# Patient Record
Sex: FEMALE | Race: WHITE | NOT HISPANIC OR LATINO | Employment: FULL TIME | URBAN - NONMETROPOLITAN AREA
[De-identification: names, ages, dates, MRNs, and addresses within clinical notes are randomized per-mention and may not be internally consistent; named-entity substitution may affect disease eponyms.]

---

## 2023-12-12 ENCOUNTER — INITIAL PRENATAL (OUTPATIENT)
Dept: OBSTETRICS AND GYNECOLOGY | Facility: CLINIC | Age: 38
End: 2023-12-12
Payer: COMMERCIAL

## 2023-12-12 VITALS — DIASTOLIC BLOOD PRESSURE: 74 MMHG | WEIGHT: 171 LBS | SYSTOLIC BLOOD PRESSURE: 120 MMHG | BODY MASS INDEX: 25.25 KG/M2

## 2023-12-12 DIAGNOSIS — Z36.0 ENCOUNTER FOR ANTENATAL SCREENING FOR CHROMOSOMAL ANOMALIES: ICD-10-CM

## 2023-12-12 DIAGNOSIS — Z3A.14 14 WEEKS GESTATION OF PREGNANCY: ICD-10-CM

## 2023-12-12 DIAGNOSIS — Z36.3 SCREENING, ANTENATAL, FOR MALFORMATION BY ULTRASOUND: ICD-10-CM

## 2023-12-12 DIAGNOSIS — G43.009 MIGRAINE WITHOUT AURA AND WITHOUT STATUS MIGRAINOSUS, NOT INTRACTABLE: ICD-10-CM

## 2023-12-12 DIAGNOSIS — Z71.85 IMMUNIZATION COUNSELING: ICD-10-CM

## 2023-12-12 DIAGNOSIS — O36.80X0 ENCOUNTER TO DETERMINE FETAL VIABILITY OF PREGNANCY, SINGLE OR UNSPECIFIED FETUS: Primary | ICD-10-CM

## 2023-12-12 DIAGNOSIS — Z98.890 HISTORY OF LOOP ELECTROSURGICAL EXCISION PROCEDURE (LEEP): ICD-10-CM

## 2023-12-12 DIAGNOSIS — Z34.82 MULTIGRAVIDA IN SECOND TRIMESTER: ICD-10-CM

## 2023-12-12 DIAGNOSIS — O09.529 ANTEPARTUM MULTIGRAVIDA OF ADVANCED MATERNAL AGE: ICD-10-CM

## 2023-12-12 RX ORDER — BUTALBITAL, ACETAMINOPHEN AND CAFFEINE 300; 40; 50 MG/1; MG/1; MG/1
1 CAPSULE ORAL EVERY 4 HOURS PRN
Qty: 30 CAPSULE | Refills: 0 | Status: SHIPPED | OUTPATIENT
Start: 2023-12-12

## 2023-12-12 NOTE — PROGRESS NOTES
38-year old patient arrived to initiate prenatal care.     HPI: 38-year old . Patient's last menstrual period was 10/05/2023. She did not think she was this far along in pregnancy as she experienced what she believed was menses in October. Pre-pregnancy weight of 165 pounds.    Previous prenatal history significant for:  c/s x 1 for persistent marginal placenta previa  History significant for: migraine, abnormal pap smear, LEEP procedure, UTIs    The following portion of the patient's history were reviewed and updated as needed: allergies, current medications, past family history, past medical history, social history, surgical history, and problem list.    ROS: All systems reviewed and are negative with exception of the following: nausea, headaches/migraines, amenorrhea      US ordered today, reviewed and shows IUP of 14w5d gestation and Estimated Date of Delivery: 24.    Last Pap Smear: 2022 NILM (ECTZ present); HPV not detected    Exam:  Wt: 171 lb for TWG of 2.722 kg (6 lb), B/P 120/74, FHTs 145   General Appearance:  healthy-appearing .  HEENT:  NCAT, EOMI, neck supple, no thyroidmegaly.  HR str and reg. No murmur. Lungs clear. Resp even and unlabored.  Abd: Soft, nontender. No CVA tenderness. Uterus is consistent with EGA.  Ext: NT, nontender. No cyanosis or edema.    Diagnoses and all orders for this visit:    1. Encounter to determine fetal viability of pregnancy, single or unspecified fetus (Primary)  - Ultrasound today and reviewed: Viable alfaro intrauterine gestation. Estimated gestational age by fetal biometry is 14 weeks, 5 days. This is 5 weeks difference from estimated gestational age by reported LMP.     2. 14 weeks gestation of pregnancy  - Discussed initial prenatal labs and ordered today:  -     ToxASSURE Select 13 (MW) - Urine, Clean Catch  -     ABO / Rh  -     CBC & Differential  -     Antibody Screen  -     Hepatitis B Surface Antigen  -     RPR, Rfx Qn RPR /  Confirm TP  -     Rubella Antibody, IgG  -     Urine Culture - , Urine, Clean Catch  -     HIV-1 / O / 2 Ag / Antibody  -     Varicella Zoster Antibody, IgG  -     Chlamydia trachomatis, Neisseria gonorrhoeae, Trichomonas vaginalis, PCR - Urine, Urine, Random Void  -     HCV Antibody Rfx To Qnt PCR  -     [facility]  -     Kelly Panorama Prenatal Test: Chromosomes 13, 18, 21, X & Y: Triploidy 22Q.11.2 Deletion - Blood,    3. Multigravida in second trimester  - Reviewed information in new OB packet, including OTC medications for use during pregnancy, second timester of pregnancy and discomforts, regular OB routine, ffDNA and maternal carrier screening testing.  Second Trimester of Pregnancy video and Round Ligament Pain education included in AVS.  - Advised to maintain regular activity.  - Reviewed and encouraged pt to report vaginal bleeding, pelvic pain or cramping, any prolonged illness or infection, or any other concerns.  - RTO in 4 weeks with Dr. Newman with U/S for cervical length and as needed with concerns  -     ToxASSURE Select 13 (MW) - Urine, Clean Catch    4. Antepartum multigravida of advanced maternal age  - Discussed aspirin 81 mg daily related to risk of preeclampsia.  -     [facility]    5. Screening, , for malformation by ultrasound  - Discussed plan of referral to perinatology at approximately 20-weeks gestation for anatomy scan. Anatomy scan to assess for possible anomalies or markers for aneuploidy.   -     [facility]    6. Encounter for  screening for chromosomal anomalies  - Discussed ffDNA and maternal carrier screening, including the Kelly pamphlet. She desires ffDNA/chromosomal risk screening at this time.   -     Kelly Panorama Prenatal Test: Chromosomes 13, 18, 21, X & Y: Triploidy 22Q.11.2 Deletion - Blood,    7. Migraine without aura and without status migrainosus, not  intractable  Discussed headache relief measures: avoiding triggers, aromatherapy, warm/cold compresses, Tylenol OTC, Tylenol + Benadryl + small amount of caffeine at onset of headache, Magnesium, epsom salt bath for relaxation, massage, chiropractor, OMT consultation. Prescription for Fioricet refilled. Rivas to be reviewed.   -     butalbital-acetaminophen-caffeine (Fioricet) -40 MG capsule capsule; Take 1 capsule by mouth Every 4 (Four) Hours As Needed (migraine headache).  Dispense: 30 capsule; Refill: 0    8. History of loop electrosurgical excision procedure (LEEP)  - Plan for cervical length at upcoming ultrasound    9. Immunization counseling  - Discussed influenza vaccine recommendations during pregnancy. Patient declines to receive the influenza immunization today in the clinic. Influenza (Flu) Vaccine (Inactivated or Recombinant): What You Need to Know (VIS) education included in the AVS.        This note has been signed electronically.     Diana Stanford, DNP, APRN, CNM, RNC-OB

## 2023-12-14 LAB
ABO GROUP BLD: NORMAL
BACTERIA UR CULT: NORMAL
BACTERIA UR CULT: NORMAL
BASOPHILS # BLD AUTO: 0.03 10*3/MM3 (ref 0–0.2)
BASOPHILS NFR BLD AUTO: 0.3 % (ref 0–1.5)
BLD GP AB SCN SERPL QL: NEGATIVE
C TRACH RRNA SPEC QL NAA+PROBE: NEGATIVE
EOSINOPHIL # BLD AUTO: 0.1 10*3/MM3 (ref 0–0.4)
EOSINOPHIL NFR BLD AUTO: 1.1 % (ref 0.3–6.2)
ERYTHROCYTE [DISTWIDTH] IN BLOOD BY AUTOMATED COUNT: 13 % (ref 12.3–15.4)
HBV SURFACE AG SERPL QL IA: NEGATIVE
HCT VFR BLD AUTO: 41.5 % (ref 34–46.6)
HCV AB SERPL QL IA: NORMAL
HCV IGG SERPL QL IA: NON REACTIVE
HGB BLD-MCNC: 13.8 G/DL (ref 12–15.9)
HIV 1+2 AB+HIV1 P24 AG SERPL QL IA: NON REACTIVE
IMM GRANULOCYTES # BLD AUTO: 0.03 10*3/MM3 (ref 0–0.05)
IMM GRANULOCYTES NFR BLD AUTO: 0.3 % (ref 0–0.5)
LYMPHOCYTES # BLD AUTO: 1.82 10*3/MM3 (ref 0.7–3.1)
LYMPHOCYTES NFR BLD AUTO: 20.8 % (ref 19.6–45.3)
MCH RBC QN AUTO: 29.4 PG (ref 26.6–33)
MCHC RBC AUTO-ENTMCNC: 33.3 G/DL (ref 31.5–35.7)
MCV RBC AUTO: 88.5 FL (ref 79–97)
MONOCYTES # BLD AUTO: 0.4 10*3/MM3 (ref 0.1–0.9)
MONOCYTES NFR BLD AUTO: 4.6 % (ref 5–12)
N GONORRHOEA RRNA SPEC QL NAA+PROBE: NEGATIVE
NEUTROPHILS # BLD AUTO: 6.36 10*3/MM3 (ref 1.7–7)
NEUTROPHILS NFR BLD AUTO: 72.9 % (ref 42.7–76)
NRBC BLD AUTO-RTO: 0 /100 WBC (ref 0–0.2)
PLATELET # BLD AUTO: 262 10*3/MM3 (ref 140–450)
RBC # BLD AUTO: 4.69 10*6/MM3 (ref 3.77–5.28)
RH BLD: POSITIVE
RPR SER QL: NON REACTIVE
RUBV IGG SERPL IA-ACNC: 2.56 INDEX
T VAGINALIS RRNA SPEC QL NAA+PROBE: NEGATIVE
VZV IGG SER IA-ACNC: 481 INDEX
WBC # BLD AUTO: 8.74 10*3/MM3 (ref 3.4–10.8)

## 2023-12-19 LAB
DRUGS UR: NORMAL
Lab: NORMAL
NTRA 22Q11.2 DELETION SYNDROME POPULATION-BASED RISK TEXT: NORMAL
NTRA 22Q11.2 DELETION SYNDROME RESULT TEXT: NORMAL
NTRA 22Q11.2 DELETION SYNDROME RISK SCORE TEXT: NORMAL
NTRA FETAL FRACTION: NORMAL
NTRA GENDER OF FETUS: NORMAL
NTRA MONOSOMY X AGE-BASED RISK TEXT: NORMAL
NTRA MONOSOMY X RESULT TEXT: NORMAL
NTRA MONOSOMY X RISK SCORE TEXT: NORMAL
NTRA TRIPLOIDY RESULT TEXT: NORMAL
NTRA TRISOMY 13 AGE-BASED RISK TEXT: NORMAL
NTRA TRISOMY 13 RESULT TEXT: NORMAL
NTRA TRISOMY 13 RISK SCORE TEXT: NORMAL
NTRA TRISOMY 18 AGE-BASED RISK TEXT: NORMAL
NTRA TRISOMY 18 RESULT TEXT: NORMAL
NTRA TRISOMY 18 RISK SCORE TEXT: NORMAL
NTRA TRISOMY 21 AGE-BASED RISK TEXT: NORMAL
NTRA TRISOMY 21 RESULT TEXT: NORMAL
NTRA TRISOMY 21 RISK SCORE TEXT: NORMAL

## 2023-12-24 PROBLEM — O34.40 HX LEEP (LOOP ELECTROSURGICAL EXCISION PROCEDURE), CERVIX, PREGNANCY: Status: ACTIVE | Noted: 2023-12-24

## 2023-12-24 PROBLEM — O09.529 ANTEPARTUM MULTIGRAVIDA OF ADVANCED MATERNAL AGE: Status: ACTIVE | Noted: 2023-12-24

## 2023-12-24 PROBLEM — Z98.890 HX LEEP (LOOP ELECTROSURGICAL EXCISION PROCEDURE), CERVIX, PREGNANCY: Status: ACTIVE | Noted: 2023-12-24

## 2023-12-24 PROBLEM — Z86.69 H/O MIGRAINE DURING PREGNANCY: Status: ACTIVE | Noted: 2023-12-24

## 2023-12-24 PROBLEM — Z87.59 H/O MIGRAINE DURING PREGNANCY: Status: ACTIVE | Noted: 2023-12-24

## 2023-12-24 PROBLEM — Z34.90 PREGNANCY: Status: ACTIVE | Noted: 2023-12-24

## 2024-01-09 ENCOUNTER — ROUTINE PRENATAL (OUTPATIENT)
Dept: OBSTETRICS AND GYNECOLOGY | Facility: CLINIC | Age: 39
End: 2024-01-09
Payer: COMMERCIAL

## 2024-01-09 VITALS — BODY MASS INDEX: 25.7 KG/M2 | DIASTOLIC BLOOD PRESSURE: 72 MMHG | SYSTOLIC BLOOD PRESSURE: 108 MMHG | WEIGHT: 174 LBS

## 2024-01-09 DIAGNOSIS — O34.42 HX LEEP (LOOP ELECTROSURGICAL EXCISION PROCEDURE), CERVIX, PREGNANCY, SECOND TRIMESTER: ICD-10-CM

## 2024-01-09 DIAGNOSIS — Z34.82 MULTIGRAVIDA IN SECOND TRIMESTER: Primary | ICD-10-CM

## 2024-01-09 DIAGNOSIS — Z98.890 HX LEEP (LOOP ELECTROSURGICAL EXCISION PROCEDURE), CERVIX, PREGNANCY, SECOND TRIMESTER: ICD-10-CM

## 2024-01-09 DIAGNOSIS — Z3A.18 18 WEEKS GESTATION OF PREGNANCY: ICD-10-CM

## 2024-01-09 DIAGNOSIS — O34.219 PREVIOUS CESAREAN DELIVERY, ANTEPARTUM: ICD-10-CM

## 2024-01-09 PROCEDURE — 0502F SUBSEQUENT PRENATAL CARE: CPT | Performed by: OBSTETRICS & GYNECOLOGY

## 2024-01-09 NOTE — PROGRESS NOTES
Starting to feel fetal movement  Feeling well  Reviewed normal prenatal labs  Reviewed normal ffDNA  Has anatomy US tomorrow  First  done for placenta previa, considering TOLAC, discussed option of scheduled repeat , 1% risk of uterine rupture    Diagnoses and all orders for this visit:    1. Multigravida in second trimester (Primary)    2. Hx LEEP (loop electrosurgical excision procedure), cervix, pregnancy, second trimester    3. 18 weeks gestation of pregnancy    4. Previous  delivery, antepartum

## 2024-02-06 ENCOUNTER — ROUTINE PRENATAL (OUTPATIENT)
Dept: OBSTETRICS AND GYNECOLOGY | Age: 39
End: 2024-02-06
Payer: COMMERCIAL

## 2024-02-06 VITALS — SYSTOLIC BLOOD PRESSURE: 106 MMHG | WEIGHT: 178 LBS | BODY MASS INDEX: 26.29 KG/M2 | DIASTOLIC BLOOD PRESSURE: 70 MMHG

## 2024-02-06 DIAGNOSIS — O34.42 HX LEEP (LOOP ELECTROSURGICAL EXCISION PROCEDURE), CERVIX, PREGNANCY, SECOND TRIMESTER: ICD-10-CM

## 2024-02-06 DIAGNOSIS — Z28.21 INFLUENZA VACCINATION DECLINED: ICD-10-CM

## 2024-02-06 DIAGNOSIS — Z34.82 MULTIGRAVIDA IN SECOND TRIMESTER: ICD-10-CM

## 2024-02-06 DIAGNOSIS — Z98.890 HX LEEP (LOOP ELECTROSURGICAL EXCISION PROCEDURE), CERVIX, PREGNANCY, SECOND TRIMESTER: ICD-10-CM

## 2024-02-06 DIAGNOSIS — Z3A.22 22 WEEKS GESTATION OF PREGNANCY: Primary | ICD-10-CM

## 2024-02-06 DIAGNOSIS — O09.899 SINGLE UMBILICAL ARTERY AFFECTING MANAGEMENT OF MOTHER IN SINGLETON PREGNANCY, ANTEPARTUM: ICD-10-CM

## 2024-02-06 PROBLEM — Z84.1 FAMILY HISTORY OF KIDNEY DISEASE: Status: ACTIVE | Noted: 2024-01-09

## 2024-02-06 LAB
GLUCOSE UR STRIP-MCNC: NEGATIVE MG/DL
PROT UR STRIP-MCNC: NEGATIVE MG/DL

## 2024-02-06 PROCEDURE — 0502F SUBSEQUENT PRENATAL CARE: CPT | Performed by: ADVANCED PRACTICE MIDWIFE

## 2024-02-14 ENCOUNTER — HOSPITAL ENCOUNTER (OUTPATIENT)
Facility: HOSPITAL | Age: 39
Setting detail: OBSERVATION
Discharge: HOME | End: 2024-02-14
Attending: OBSTETRICS & GYNECOLOGY | Admitting: OBSTETRICS & GYNECOLOGY
Payer: COMMERCIAL

## 2024-02-14 VITALS
SYSTOLIC BLOOD PRESSURE: 134 MMHG | TEMPERATURE: 98.1 F | HEART RATE: 101 BPM | RESPIRATION RATE: 16 BRPM | DIASTOLIC BLOOD PRESSURE: 71 MMHG | OXYGEN SATURATION: 97 %

## 2024-02-14 LAB
A1 MICROGLOB PLACENTAL VAG QL: NEGATIVE
CLUE CELLS SPEC QL WET PREP: NORMAL
HYDATID CYST SPEC WET PREP: NORMAL
T VAGINALIS SPEC QL WET PREP: NORMAL
WBC SPEC QL WET PREP: NORMAL
YEAST GENITAL QL WET PREP: NORMAL

## 2024-02-14 PROCEDURE — G0463 HOSPITAL OUTPT CLINIC VISIT: HCPCS

## 2024-02-14 PROCEDURE — G0378 HOSPITAL OBSERVATION PER HR: HCPCS

## 2024-02-14 PROCEDURE — 87210 SMEAR WET MOUNT SALINE/INK: CPT | Performed by: OBSTETRICS & GYNECOLOGY

## 2024-02-14 PROCEDURE — 84112 EVAL AMNIOTIC FLUID PROTEIN: CPT | Performed by: OBSTETRICS & GYNECOLOGY

## 2024-02-14 NOTE — NURSING NOTE
Patient presents to unit with complaints of leaking of fluid. States it began approx 2 days ago. States she has felt like her underwear are frequently damp.    Juhi Barillas RN  10:36 CST

## 2024-02-14 NOTE — NURSING NOTE
Patient educated on S/S of labor, educated to return to L&D for consistent, painful contractions, leaking of fluid, vaginal bleeding, or decreased fetal movement. Urgent maternal warnings signs reviewed and handout given.    Juhi Barillas RN  11:13 CST

## 2024-03-05 ENCOUNTER — ROUTINE PRENATAL (OUTPATIENT)
Dept: OBSTETRICS AND GYNECOLOGY | Age: 39
End: 2024-03-05
Payer: COMMERCIAL

## 2024-03-05 VITALS — SYSTOLIC BLOOD PRESSURE: 120 MMHG | DIASTOLIC BLOOD PRESSURE: 74 MMHG | WEIGHT: 184 LBS | BODY MASS INDEX: 27.17 KG/M2

## 2024-03-05 DIAGNOSIS — O34.219 PREVIOUS CESAREAN DELIVERY, ANTEPARTUM: ICD-10-CM

## 2024-03-05 DIAGNOSIS — Z98.890 HX LEEP (LOOP ELECTROSURGICAL EXCISION PROCEDURE), CERVIX, PREGNANCY, SECOND TRIMESTER: ICD-10-CM

## 2024-03-05 DIAGNOSIS — O34.42 HX LEEP (LOOP ELECTROSURGICAL EXCISION PROCEDURE), CERVIX, PREGNANCY, SECOND TRIMESTER: ICD-10-CM

## 2024-03-05 DIAGNOSIS — O09.529 ANTEPARTUM MULTIGRAVIDA OF ADVANCED MATERNAL AGE: ICD-10-CM

## 2024-03-05 DIAGNOSIS — O09.899 SINGLE UMBILICAL ARTERY AFFECTING MANAGEMENT OF MOTHER IN SINGLETON PREGNANCY, ANTEPARTUM: Primary | ICD-10-CM

## 2024-03-05 PROCEDURE — 0502F SUBSEQUENT PRENATAL CARE: CPT | Performed by: OBSTETRICS & GYNECOLOGY

## 2024-03-05 NOTE — PROGRESS NOTES
Good fetal movement  Reviewed normal anatomy US  Glucola and Hgb ordered for next visit  US today 27% growth, POOJA 16.1cm    Diagnoses and all orders for this visit:    1. Single umbilical artery affecting management of mother in alfaro pregnancy, antepartum (Primary)    2. Hx LEEP (loop electrosurgical excision procedure), cervix, pregnancy, second trimester    3. Antepartum multigravida of advanced maternal age    4. Previous  delivery, antepartum

## 2024-03-19 ENCOUNTER — ROUTINE PRENATAL (OUTPATIENT)
Dept: OBSTETRICS AND GYNECOLOGY | Age: 39
End: 2024-03-19
Payer: COMMERCIAL

## 2024-03-19 VITALS — SYSTOLIC BLOOD PRESSURE: 122 MMHG | DIASTOLIC BLOOD PRESSURE: 68 MMHG | WEIGHT: 186 LBS | BODY MASS INDEX: 27.47 KG/M2

## 2024-03-19 DIAGNOSIS — Z71.85 IMMUNIZATION COUNSELING: ICD-10-CM

## 2024-03-19 DIAGNOSIS — O09.523 MULTIGRAVIDA OF ADVANCED MATERNAL AGE IN THIRD TRIMESTER: ICD-10-CM

## 2024-03-19 DIAGNOSIS — Z13.1 SPECIAL SCREENING EXAMINATION FOR DIABETES MELLITUS: ICD-10-CM

## 2024-03-19 DIAGNOSIS — Z13.0 SCREENING FOR DEFICIENCY ANEMIA: ICD-10-CM

## 2024-03-19 DIAGNOSIS — Z11.3 ROUTINE SCREENING FOR STI (SEXUALLY TRANSMITTED INFECTION): ICD-10-CM

## 2024-03-19 DIAGNOSIS — O09.899 SINGLE UMBILICAL ARTERY AFFECTING MANAGEMENT OF MOTHER IN SINGLETON PREGNANCY, ANTEPARTUM: ICD-10-CM

## 2024-03-19 DIAGNOSIS — Z3A.28 28 WEEKS GESTATION OF PREGNANCY: Primary | ICD-10-CM

## 2024-03-19 LAB
GLUCOSE UR STRIP-MCNC: NEGATIVE MG/DL
PROT UR STRIP-MCNC: NEGATIVE MG/DL

## 2024-03-19 PROCEDURE — 0502F SUBSEQUENT PRENATAL CARE: CPT | Performed by: ADVANCED PRACTICE MIDWIFE

## 2024-03-19 NOTE — PROGRESS NOTES
Reason for visit: Routine OB visit at 28w5d     CC:  Endorses good fetal movement. Denies VB, LOF, contractions, h/a, visual changes, and right upper quadrant pain.     ROS: All systems reviewed and are negative.    Wt 186 lb for a TWG of 9.526 kg (21 lb), /68, FHTs 132, FH 29 cm   Urine today and reviewed: negative glucose, negative protein      26-week Ultrasound: EFW 27%, 931 g; AC 27%; normal interval growth; transverse; posterior placenta       Exam:  General Appearance:  No visualized signs of distress. Normal mood and behavior  Cardiorespiratory: HR str and reg. Lungs clear. Resp even and unlabored.  Abdomen: is soft and nontender. No CVA tenderness. Uterus is consistent with an estimated gestational age.  Ext: No edema. Calves non-tender.     Impression  Diagnoses and all orders for this visit:    1. 28 weeks gestation of pregnancy (Primary)  -     POC Urinalysis Dipstick  -     Gestational Screen 1 Hr ([facility])  -     Hemoglobin  -     RPR, Rfx Qn RPR / Confirm TP    2. Multigravida of advanced maternal age in third trimester  Discussed third trimester of pregnancy, including discomforts and measures of support,  labor warnings, preeclampsia warnings, and signs and symptoms to report. Discussed glucose and hemoglobin screenings today in the clinic. Labs ordered today. Patient to notify provider and/or come to the hospital for vaginal bleeding, leaking of fluid, contractions, or other concerns. Third Trimester of Pregnancy video included in the AVS.    3. Single umbilical artery affecting management of mother in alfaro pregnancy, antepartum  26-week interval growth ultrasound reviewed and normal interval growth noted. Plan for interval growth at next prenatal visit.     4. Special screening examination for diabetes mellitus  -     Gestational Screen 1 Hr ([facility])    5. Screening for deficiency anemia  -     Hemoglobin    6. Routine screening for STI (sexually transmitted infection)  -      RPR, Rfx Qn RPR / Confirm TP    7. Immunization counseling  Reviewed Tdap immunization recommendations during pregnancy. Will consider receiving the Tdap immunization during next prenatal visit. Tdap (Tetanus, Diptheria, Pertussis) Vaccine: What You Need to Know (VIS) education included in the AVS.          Return to the office in 2 weeks for routine prenatal visit with an interval growth and 4D ultrasound and as needed with concerns.        This note has been signed electronically.    Diana Stanford, DNP, APRN, CNM, RNC-OB

## 2024-03-20 LAB
GLUCOSE 1H P 50 G GLC PO SERPL-MCNC: 121 MG/DL (ref 65–139)
HGB BLD-MCNC: 11.8 G/DL (ref 12–15.9)
RPR SER QL: NON REACTIVE

## 2024-04-09 ENCOUNTER — ROUTINE PRENATAL (OUTPATIENT)
Age: 39
End: 2024-04-09
Payer: COMMERCIAL

## 2024-04-09 VITALS — SYSTOLIC BLOOD PRESSURE: 108 MMHG | WEIGHT: 186 LBS | BODY MASS INDEX: 27.47 KG/M2 | DIASTOLIC BLOOD PRESSURE: 72 MMHG

## 2024-04-09 DIAGNOSIS — O34.219 PREVIOUS CESAREAN DELIVERY, ANTEPARTUM: ICD-10-CM

## 2024-04-09 DIAGNOSIS — O09.523 MULTIGRAVIDA OF ADVANCED MATERNAL AGE IN THIRD TRIMESTER: Primary | ICD-10-CM

## 2024-04-09 PROCEDURE — 90471 IMMUNIZATION ADMIN: CPT | Performed by: OBSTETRICS & GYNECOLOGY

## 2024-04-09 PROCEDURE — 90715 TDAP VACCINE 7 YRS/> IM: CPT | Performed by: OBSTETRICS & GYNECOLOGY

## 2024-04-09 PROCEDURE — 0502F SUBSEQUENT PRENATAL CARE: CPT | Performed by: OBSTETRICS & GYNECOLOGY

## 2024-04-09 NOTE — PROGRESS NOTES
Good fetal movement  Taking TUMs for reflux, recommend Pepcid for severe symptoms  Reviewed normal Glucola and Hgb  Schedule repeat    Tdap in office today   labor precautions    Diagnoses and all orders for this visit:    1. Multigravida of advanced maternal age in third trimester (Primary)    2. Previous  delivery, antepartum  -     Tdap Vaccine Greater Than or Equal To 8yo IM  -     Case Request

## 2024-04-12 PROBLEM — O34.219 PREVIOUS CESAREAN DELIVERY, ANTEPARTUM: Status: ACTIVE | Noted: 2024-04-09

## 2024-04-23 ENCOUNTER — ROUTINE PRENATAL (OUTPATIENT)
Age: 39
End: 2024-04-23
Payer: COMMERCIAL

## 2024-04-23 VITALS — DIASTOLIC BLOOD PRESSURE: 74 MMHG | WEIGHT: 189 LBS | BODY MASS INDEX: 27.91 KG/M2 | SYSTOLIC BLOOD PRESSURE: 122 MMHG

## 2024-04-23 DIAGNOSIS — O09.523 MULTIGRAVIDA OF ADVANCED MATERNAL AGE IN THIRD TRIMESTER: Primary | ICD-10-CM

## 2024-04-23 DIAGNOSIS — O34.219 PREVIOUS CESAREAN DELIVERY, ANTEPARTUM: ICD-10-CM

## 2024-04-23 PROCEDURE — 0502F SUBSEQUENT PRENATAL CARE: CPT | Performed by: OBSTETRICS & GYNECOLOGY

## 2024-04-23 NOTE — PROGRESS NOTES
Good fetal movement  No contractions  Normal growth US last visit, repeat at 37 weeks for 2vc  Reviewed normal Glucola and Hgb   labor precautions    Diagnoses and all orders for this visit:    1. Multigravida of advanced maternal age in third trimester (Primary)    2. Previous  delivery, antepartum

## 2024-05-07 ENCOUNTER — ROUTINE PRENATAL (OUTPATIENT)
Age: 39
End: 2024-05-07
Payer: COMMERCIAL

## 2024-05-07 VITALS — DIASTOLIC BLOOD PRESSURE: 74 MMHG | BODY MASS INDEX: 27.91 KG/M2 | SYSTOLIC BLOOD PRESSURE: 108 MMHG | WEIGHT: 189 LBS

## 2024-05-07 DIAGNOSIS — O09.899 SINGLE UMBILICAL ARTERY AFFECTING MANAGEMENT OF MOTHER IN SINGLETON PREGNANCY, ANTEPARTUM: ICD-10-CM

## 2024-05-07 DIAGNOSIS — O34.219 PREVIOUS CESAREAN DELIVERY, ANTEPARTUM: ICD-10-CM

## 2024-05-07 DIAGNOSIS — O09.523 MULTIGRAVIDA OF ADVANCED MATERNAL AGE IN THIRD TRIMESTER: Primary | ICD-10-CM

## 2024-05-07 PROCEDURE — 0502F SUBSEQUENT PRENATAL CARE: CPT | Performed by: OBSTETRICS & GYNECOLOGY

## 2024-05-14 ENCOUNTER — ROUTINE PRENATAL (OUTPATIENT)
Age: 39
End: 2024-05-14
Payer: COMMERCIAL

## 2024-05-14 VITALS — BODY MASS INDEX: 28.06 KG/M2 | SYSTOLIC BLOOD PRESSURE: 112 MMHG | DIASTOLIC BLOOD PRESSURE: 72 MMHG | WEIGHT: 190 LBS

## 2024-05-14 DIAGNOSIS — O09.523 MULTIGRAVIDA OF ADVANCED MATERNAL AGE IN THIRD TRIMESTER: Primary | ICD-10-CM

## 2024-05-14 DIAGNOSIS — O34.219 PREVIOUS CESAREAN DELIVERY, ANTEPARTUM: ICD-10-CM

## 2024-05-14 DIAGNOSIS — O09.899 SINGLE UMBILICAL ARTERY AFFECTING MANAGEMENT OF MOTHER IN SINGLETON PREGNANCY, ANTEPARTUM: ICD-10-CM

## 2024-05-14 PROCEDURE — 0502F SUBSEQUENT PRENATAL CARE: CPT | Performed by: OBSTETRICS & GYNECOLOGY

## 2024-05-14 RX ORDER — METHYLERGONOVINE MALEATE 0.2 MG/ML
200 INJECTION INTRAVENOUS ONCE AS NEEDED
OUTPATIENT
Start: 2024-05-14

## 2024-05-14 RX ORDER — HYDROMORPHONE HYDROCHLORIDE 1 MG/ML
0.5 INJECTION, SOLUTION INTRAMUSCULAR; INTRAVENOUS; SUBCUTANEOUS
OUTPATIENT
Start: 2024-05-14 | End: 2024-05-24

## 2024-05-14 RX ORDER — KETOROLAC TROMETHAMINE 15 MG/ML
30 INJECTION, SOLUTION INTRAMUSCULAR; INTRAVENOUS ONCE
OUTPATIENT
Start: 2024-05-14 | End: 2024-05-14

## 2024-05-14 RX ORDER — ONDANSETRON 2 MG/ML
4 INJECTION INTRAMUSCULAR; INTRAVENOUS EVERY 6 HOURS PRN
OUTPATIENT
Start: 2024-05-14

## 2024-05-14 RX ORDER — SODIUM CHLORIDE, SODIUM LACTATE, POTASSIUM CHLORIDE, CALCIUM CHLORIDE 600; 310; 30; 20 MG/100ML; MG/100ML; MG/100ML; MG/100ML
125 INJECTION, SOLUTION INTRAVENOUS CONTINUOUS
OUTPATIENT
Start: 2024-05-14

## 2024-05-14 RX ORDER — FAMOTIDINE 10 MG/ML
20 INJECTION, SOLUTION INTRAVENOUS ONCE AS NEEDED
OUTPATIENT
Start: 2024-05-14

## 2024-05-14 RX ORDER — OXYTOCIN/0.9 % SODIUM CHLORIDE 30/500 ML
250 PLASTIC BAG, INJECTION (ML) INTRAVENOUS CONTINUOUS
OUTPATIENT
Start: 2024-05-14 | End: 2024-05-14

## 2024-05-14 RX ORDER — MISOPROSTOL 100 UG/1
800 TABLET ORAL ONCE AS NEEDED
OUTPATIENT
Start: 2024-05-14

## 2024-05-14 RX ORDER — OXYTOCIN/0.9 % SODIUM CHLORIDE 30/500 ML
999 PLASTIC BAG, INJECTION (ML) INTRAVENOUS ONCE
OUTPATIENT
Start: 2024-05-14 | End: 2024-05-14

## 2024-05-14 RX ORDER — SODIUM CHLORIDE 9 MG/ML
40 INJECTION, SOLUTION INTRAVENOUS AS NEEDED
OUTPATIENT
Start: 2024-05-14

## 2024-05-14 RX ORDER — ONDANSETRON 4 MG/1
4 TABLET, ORALLY DISINTEGRATING ORAL EVERY 6 HOURS PRN
OUTPATIENT
Start: 2024-05-14

## 2024-05-14 RX ORDER — SODIUM CHLORIDE 0.9 % (FLUSH) 0.9 %
10 SYRINGE (ML) INJECTION AS NEEDED
OUTPATIENT
Start: 2024-05-14

## 2024-05-14 RX ORDER — FAMOTIDINE 10 MG
20 TABLET ORAL ONCE AS NEEDED
OUTPATIENT
Start: 2024-05-14

## 2024-05-14 RX ORDER — CARBOPROST TROMETHAMINE 250 UG/ML
250 INJECTION, SOLUTION INTRAMUSCULAR AS NEEDED
OUTPATIENT
Start: 2024-05-14

## 2024-05-14 RX ORDER — ACETAMINOPHEN 500 MG
1000 TABLET ORAL ONCE
OUTPATIENT
Start: 2024-05-14 | End: 2024-05-14

## 2024-05-14 RX ORDER — SODIUM CHLORIDE 0.9 % (FLUSH) 0.9 %
10 SYRINGE (ML) INJECTION EVERY 12 HOURS SCHEDULED
OUTPATIENT
Start: 2024-05-14

## 2024-05-14 NOTE — PROGRESS NOTES
Good fetal movement  US today 30% growth, POOJA 10cm, vertex  Surg pack done  Labor instructions    Diagnoses and all orders for this visit:    1. Multigravida of advanced maternal age in third trimester (Primary)    2. Previous  delivery, antepartum    3. Single umbilical artery affecting management of mother in alfaro pregnancy, antepartum

## 2024-05-14 NOTE — H&P
[facility]  Rut Blackwell  : 1985  MRN: [id number]  CSN: [id number]    History and Physical    Subjective   Rut Blackwell is a 38 y.o. year old  with an Estimated Date of Delivery: 24 scheduled on  for  delivery due to previous  section declines .  She is not planning for sterilization at the time of the .    Prenatal care has been with Dr. Art Newman.  It has been complicated by history of a LEEP, 2 vessel cord, and prior  .    OB History    Para Term  AB Living   2 1 0 1 0 1   SAB IAB Ectopic Molar Multiple Live Births   0 0 0 0 0 1      # Outcome Date GA Lbr Oral/2nd Weight Sex Type Anes PTL Lv   2 Current            1  22 36w1d  3040 g (6 lb 11.2 oz) M CS-LTranv Gen  BANDAR      Birth Comments: 35.5cm       Name: MSITY HOU      Apgar1: 7  Apgar5: 9     Past Medical History:   Diagnosis Date    Abnormal Pap smear of cervix     Chlamydia     Migraine     Since puberty    Urinary tract infection     Varicella      Past Surgical History:   Procedure Laterality Date    CERVICAL BIOPSY  W/ LOOP ELECTRODE EXCISION       SECTION N/A 2022    Procedure:  SECTION PRIMARY;  Surgeon: Jesse Newman MD;  Location: [facility] LABOR DELIVERY;  Service: Obstetrics/Gynecology;  Laterality: N/A;    WISDOM TOOTH EXTRACTION  -       Current Outpatient Medications:     Prenatal Vit-Fe Fumarate-FA (PRENATAL VITAMIN AND MINERAL PO), Take  by mouth., Disp: , Rfl:   Family History   Problem Relation Age of Onset    No Known Problems Father     Breast cancer Mother     Breast cancer Maternal Aunt     Diabetes Paternal Uncle     Ovarian cancer Neg Hx     Uterine cancer Neg Hx     Colon cancer Neg Hx     Melanoma Neg Hx        No Known Allergies  Social History    Tobacco Use      Smoking status: Never      Smokeless tobacco: Never    Review of Systems      Objective   /72   Wt 86.2 kg  (190 lb)   LMP 10/05/2023   BMI 28.06 kg/m²   General: well developed; well nourished  no acute distress   Heart: regular rate and rhythm   Lungs: breathing is unlabored   Abdomen: soft, non-tender; no masses     Cervix:   presenting part vertex  Prenatal Labs  Lab Results   Component Value Date    HGB 11.8 (L) 2024    HEPBSAG Negative 2023    ABO O 2023    RH Positive 2023    ABSCRN Negative 2023    WFX5RNX4 Non Reactive 2023    HEPCVIRUSABY 0.1 2022    URINECX Final report 2023       Recent Labs  Lab Results   Component Value Date    HGB 11.8 (L) 2024    HCT 41.5 2023    WBC 8.74 2023     2023           Assessment   IUP with an Estimated Date of Delivery: 24  Planned  section on  for previous  section declines .     Plan   Repeat     Risks, benefits, and alternatives to  section were discussed with the patient at  length.  The surgical nature of  section was discussed.  The indications for  section were discussed.  Risks of bleeding, infection, and damage to surrounding organs were reviewed.  Injury to blood vessels, the urinary bladder, the ureter, and the intestines were all reviewed.  Management of these complications were reviewed.    All of the patient's questions were answered to her satisfaction.  She verbalized understanding.  She wished to proceed.     Jesse Newman MD  2024

## 2024-05-14 NOTE — H&P (VIEW-ONLY)
[facility]  Rut Blackwell  : 1985  MRN: [id number]  CSN: [id number]    History and Physical    Subjective   Rut Blackwell is a 38 y.o. year old  with an Estimated Date of Delivery: 24 scheduled on  for  delivery due to previous  section declines .  She is not planning for sterilization at the time of the .    Prenatal care has been with Dr. Art Newman.  It has been complicated by history of a LEEP, 2 vessel cord, and prior  .    OB History    Para Term  AB Living   2 1 0 1 0 1   SAB IAB Ectopic Molar Multiple Live Births   0 0 0 0 0 1      # Outcome Date GA Lbr Oral/2nd Weight Sex Type Anes PTL Lv   2 Current            1  22 36w1d  3040 g (6 lb 11.2 oz) M CS-LTranv Gen  BANDAR      Birth Comments: 35.5cm       Name: MISTY HOU      Apgar1: 7  Apgar5: 9     Past Medical History:   Diagnosis Date    Abnormal Pap smear of cervix     Chlamydia     Migraine     Since puberty    Urinary tract infection     Varicella      Past Surgical History:   Procedure Laterality Date    CERVICAL BIOPSY  W/ LOOP ELECTRODE EXCISION       SECTION N/A 2022    Procedure:  SECTION PRIMARY;  Surgeon: Jesse Newman MD;  Location: [facility] LABOR DELIVERY;  Service: Obstetrics/Gynecology;  Laterality: N/A;    WISDOM TOOTH EXTRACTION  -       Current Outpatient Medications:     Prenatal Vit-Fe Fumarate-FA (PRENATAL VITAMIN AND MINERAL PO), Take  by mouth., Disp: , Rfl:   Family History   Problem Relation Age of Onset    No Known Problems Father     Breast cancer Mother     Breast cancer Maternal Aunt     Diabetes Paternal Uncle     Ovarian cancer Neg Hx     Uterine cancer Neg Hx     Colon cancer Neg Hx     Melanoma Neg Hx        No Known Allergies  Social History    Tobacco Use      Smoking status: Never      Smokeless tobacco: Never    Review of Systems      Objective   /72   Wt 86.2 kg  (190 lb)   LMP 10/05/2023   BMI 28.06 kg/m²   General: well developed; well nourished  no acute distress   Heart: regular rate and rhythm   Lungs: breathing is unlabored   Abdomen: soft, non-tender; no masses     Cervix:   presenting part vertex  Prenatal Labs  Lab Results   Component Value Date    HGB 11.8 (L) 2024    HEPBSAG Negative 2023    ABO O 2023    RH Positive 2023    ABSCRN Negative 2023    [id number] Non Reactive 2023    HEPCVIRUSABY 0.1 2022    URINECX Final report 2023       Recent Labs  Lab Results   Component Value Date    HGB 11.8 (L) 2024    HCT 41.5 2023    WBC 8.74 2023     2023           Assessment   IUP with an Estimated Date of Delivery: 24  Planned  section on  for previous  section declines .     Plan   Repeat     Risks, benefits, and alternatives to  section were discussed with the patient at  length.  The surgical nature of  section was discussed.  The indications for  section were discussed.  Risks of bleeding, infection, and damage to surrounding organs were reviewed.  Injury to blood vessels, the urinary bladder, the ureter, and the intestines were all reviewed.  Management of these complications were reviewed.    All of the patient's questions were answered to her satisfaction.  She verbalized understanding.  She wished to proceed.     Jesse Newman MD  2024

## 2024-05-21 ENCOUNTER — ROUTINE PRENATAL (OUTPATIENT)
Age: 39
End: 2024-05-21
Payer: COMMERCIAL

## 2024-05-21 VITALS — SYSTOLIC BLOOD PRESSURE: 114 MMHG | WEIGHT: 191 LBS | DIASTOLIC BLOOD PRESSURE: 74 MMHG | BODY MASS INDEX: 28.21 KG/M2

## 2024-05-21 DIAGNOSIS — O09.523 MULTIGRAVIDA OF ADVANCED MATERNAL AGE IN THIRD TRIMESTER: Primary | ICD-10-CM

## 2024-05-21 DIAGNOSIS — O34.219 PREVIOUS CESAREAN DELIVERY, ANTEPARTUM: ICD-10-CM

## 2024-05-21 DIAGNOSIS — Q27.0 TWO VESSEL UMBILICAL CORD: ICD-10-CM

## 2024-05-21 NOTE — PROGRESS NOTES
Good fetal movement  No contractions  Reviewed normal growth US last visit  Repeat  next visit  Labor instructions    Diagnoses and all orders for this visit:    1. Multigravida of advanced maternal age in third trimester (Primary)    2. Two vessel umbilical cord    3. Previous  delivery, antepartum

## 2024-05-29 ENCOUNTER — ANESTHESIA EVENT (OUTPATIENT)
Dept: LABOR AND DELIVERY | Facility: HOSPITAL | Age: 39
End: 2024-05-29
Payer: COMMERCIAL

## 2024-05-30 ENCOUNTER — HOSPITAL ENCOUNTER (INPATIENT)
Facility: HOSPITAL | Age: 39
LOS: 2 days | Discharge: HOME | End: 2024-06-01
Attending: OBSTETRICS & GYNECOLOGY | Admitting: OBSTETRICS & GYNECOLOGY
Payer: COMMERCIAL

## 2024-05-30 ENCOUNTER — ANESTHESIA (OUTPATIENT)
Dept: LABOR AND DELIVERY | Facility: HOSPITAL | Age: 39
End: 2024-05-30
Payer: COMMERCIAL

## 2024-05-30 DIAGNOSIS — O34.219 PREVIOUS CESAREAN DELIVERY, ANTEPARTUM: ICD-10-CM

## 2024-05-30 PROBLEM — Z98.891 PREVIOUS CESAREAN SECTION: Status: ACTIVE | Noted: 2024-05-30

## 2024-05-30 LAB
ABO GROUP BLD: NORMAL
BLD GP AB SCN SERPL QL: NEGATIVE
DEPRECATED RDW RBC AUTO: 45.1 FL (ref 37–54)
ERYTHROCYTE [DISTWIDTH] IN BLOOD BY AUTOMATED COUNT: 13.4 % (ref 12.3–15.4)
HCT VFR BLD AUTO: 33 % (ref 34–46.6)
HGB BLD-MCNC: 10.7 G/DL (ref 12–15.9)
MCH RBC QN AUTO: 29.7 PG (ref 26.6–33)
MCHC RBC AUTO-ENTMCNC: 32.4 G/DL (ref 31.5–35.7)
MCV RBC AUTO: 91.7 FL (ref 79–97)
PLATELET # BLD AUTO: 220 10*3/MM3 (ref 140–450)
PMV BLD AUTO: 10.7 FL (ref 6–12)
RBC # BLD AUTO: 3.6 10*6/MM3 (ref 3.77–5.28)
RH BLD: POSITIVE
T PALLIDUM IGG SER QL: NORMAL
T&S EXPIRATION DATE: NORMAL
WBC NRBC COR # BLD AUTO: 10.83 10*3/MM3 (ref 3.4–10.8)

## 2024-05-30 PROCEDURE — 25010000002 OXYTOCIN PER 10 UNITS: Performed by: NURSE ANESTHETIST, CERTIFIED REGISTERED

## 2024-05-30 PROCEDURE — 25810000003 LACTATED RINGERS PER 1000 ML: Performed by: OBSTETRICS & GYNECOLOGY

## 2024-05-30 PROCEDURE — 25010000002 DROPERIDOL PER 5 MG: Performed by: NURSE ANESTHETIST, CERTIFIED REGISTERED

## 2024-05-30 PROCEDURE — 86901 BLOOD TYPING SEROLOGIC RH(D): CPT | Performed by: OBSTETRICS & GYNECOLOGY

## 2024-05-30 PROCEDURE — 86780 TREPONEMA PALLIDUM: CPT | Performed by: OBSTETRICS & GYNECOLOGY

## 2024-05-30 PROCEDURE — 25010000002 ONDANSETRON PER 1 MG: Performed by: NURSE ANESTHETIST, CERTIFIED REGISTERED

## 2024-05-30 PROCEDURE — 25010000002 DEXAMETHASONE PER 1 MG: Performed by: NURSE ANESTHETIST, CERTIFIED REGISTERED

## 2024-05-30 PROCEDURE — 25010000002 KETOROLAC TROMETHAMINE PER 15 MG: Performed by: OBSTETRICS & GYNECOLOGY

## 2024-05-30 PROCEDURE — 59510 CESAREAN DELIVERY: CPT | Performed by: OBSTETRICS & GYNECOLOGY

## 2024-05-30 PROCEDURE — 25010000002 BUPIVACAINE PF 0.75 % SOLUTION: Performed by: NURSE ANESTHETIST, CERTIFIED REGISTERED

## 2024-05-30 PROCEDURE — 25010000002 HYDROMORPHONE 1 MG/ML SOLUTION: Performed by: NURSE ANESTHETIST, CERTIFIED REGISTERED

## 2024-05-30 PROCEDURE — 86900 BLOOD TYPING SEROLOGIC ABO: CPT | Performed by: OBSTETRICS & GYNECOLOGY

## 2024-05-30 PROCEDURE — 25010000002 PROMETHAZINE PER 50 MG: Performed by: NURSE PRACTITIONER

## 2024-05-30 PROCEDURE — 86850 RBC ANTIBODY SCREEN: CPT | Performed by: OBSTETRICS & GYNECOLOGY

## 2024-05-30 PROCEDURE — 25010000002 CEFAZOLIN PER 500 MG: Performed by: OBSTETRICS & GYNECOLOGY

## 2024-05-30 PROCEDURE — 85027 COMPLETE CBC AUTOMATED: CPT | Performed by: OBSTETRICS & GYNECOLOGY

## 2024-05-30 PROCEDURE — 51702 INSERT TEMP BLADDER CATH: CPT

## 2024-05-30 PROCEDURE — 25010000002 ONDANSETRON PER 1 MG: Performed by: OBSTETRICS & GYNECOLOGY

## 2024-05-30 RX ORDER — CARBOPROST TROMETHAMINE 250 UG/ML
250 INJECTION, SOLUTION INTRAMUSCULAR AS NEEDED
Status: DISCONTINUED | OUTPATIENT
Start: 2024-05-30 | End: 2024-05-30 | Stop reason: HOSPADM

## 2024-05-30 RX ORDER — MISOPROSTOL 200 UG/1
800 TABLET ORAL ONCE AS NEEDED
Status: COMPLETED | OUTPATIENT
Start: 2024-05-30 | End: 2024-05-30

## 2024-05-30 RX ORDER — ONDANSETRON 2 MG/ML
4 INJECTION INTRAMUSCULAR; INTRAVENOUS EVERY 6 HOURS PRN
Status: DISCONTINUED | OUTPATIENT
Start: 2024-05-30 | End: 2024-05-30 | Stop reason: HOSPADM

## 2024-05-30 RX ORDER — MISOPROSTOL 200 UG/1
800 TABLET ORAL ONCE AS NEEDED
Status: DISCONTINUED | OUTPATIENT
Start: 2024-05-30 | End: 2024-06-01 | Stop reason: HOSPADM

## 2024-05-30 RX ORDER — SIMETHICONE 80 MG
80 TABLET,CHEWABLE ORAL 4 TIMES DAILY PRN
Status: DISCONTINUED | OUTPATIENT
Start: 2024-05-30 | End: 2024-06-01 | Stop reason: HOSPADM

## 2024-05-30 RX ORDER — PRENATAL VIT/IRON FUM/FOLIC AC 27MG-0.8MG
1 TABLET ORAL DAILY
Status: DISCONTINUED | OUTPATIENT
Start: 2024-05-30 | End: 2024-06-01 | Stop reason: HOSPADM

## 2024-05-30 RX ORDER — HYDROCODONE BITARTRATE AND ACETAMINOPHEN 5; 325 MG/1; MG/1
1 TABLET ORAL EVERY 4 HOURS PRN
Status: ACTIVE | OUTPATIENT
Start: 2024-05-30 | End: 2024-05-31

## 2024-05-30 RX ORDER — OXYTOCIN/0.9 % SODIUM CHLORIDE 30/500 ML
999 PLASTIC BAG, INJECTION (ML) INTRAVENOUS ONCE
Status: DISCONTINUED | OUTPATIENT
Start: 2024-05-30 | End: 2024-05-30 | Stop reason: HOSPADM

## 2024-05-30 RX ORDER — KETOROLAC TROMETHAMINE 15 MG/ML
15 INJECTION, SOLUTION INTRAMUSCULAR; INTRAVENOUS EVERY 6 HOURS
Status: DISPENSED | OUTPATIENT
Start: 2024-05-30 | End: 2024-05-31

## 2024-05-30 RX ORDER — ACETAMINOPHEN 500 MG
1000 TABLET ORAL EVERY 6 HOURS
Qty: 4 TABLET | Refills: 0 | Status: COMPLETED | OUTPATIENT
Start: 2024-05-30 | End: 2024-05-31

## 2024-05-30 RX ORDER — ONDANSETRON 4 MG/1
4 TABLET, ORALLY DISINTEGRATING ORAL EVERY 8 HOURS PRN
Status: DISCONTINUED | OUTPATIENT
Start: 2024-05-30 | End: 2024-06-01 | Stop reason: HOSPADM

## 2024-05-30 RX ORDER — NALBUPHINE HYDROCHLORIDE 10 MG/ML
2.5 INJECTION, SOLUTION INTRAMUSCULAR; INTRAVENOUS; SUBCUTANEOUS EVERY 4 HOURS PRN
Status: DISCONTINUED | OUTPATIENT
Start: 2024-05-30 | End: 2024-05-30 | Stop reason: RX

## 2024-05-30 RX ORDER — METHYLERGONOVINE MALEATE 0.2 MG/ML
200 INJECTION INTRAVENOUS AS NEEDED
Status: DISCONTINUED | OUTPATIENT
Start: 2024-05-30 | End: 2024-06-01 | Stop reason: HOSPADM

## 2024-05-30 RX ORDER — CITRIC ACID/SODIUM CITRATE 334-500MG
15 SOLUTION, ORAL ORAL ONCE
Status: COMPLETED | OUTPATIENT
Start: 2024-05-30 | End: 2024-05-30

## 2024-05-30 RX ORDER — IBUPROFEN 200 MG
400 TABLET ORAL EVERY 6 HOURS PRN
Status: ACTIVE | OUTPATIENT
Start: 2024-05-30 | End: 2024-05-31

## 2024-05-30 RX ORDER — DROPERIDOL 2.5 MG/ML
0.62 INJECTION, SOLUTION INTRAMUSCULAR; INTRAVENOUS ONCE
Status: COMPLETED | OUTPATIENT
Start: 2024-05-30 | End: 2024-05-30

## 2024-05-30 RX ORDER — DEXAMETHASONE SODIUM PHOSPHATE 4 MG/ML
INJECTION, SOLUTION INTRA-ARTICULAR; INTRALESIONAL; INTRAMUSCULAR; INTRAVENOUS; SOFT TISSUE AS NEEDED
Status: DISCONTINUED | OUTPATIENT
Start: 2024-05-30 | End: 2024-05-31 | Stop reason: SURG

## 2024-05-30 RX ORDER — ONDANSETRON 2 MG/ML
4 INJECTION INTRAMUSCULAR; INTRAVENOUS EVERY 6 HOURS PRN
Status: DISCONTINUED | OUTPATIENT
Start: 2024-05-30 | End: 2024-06-01 | Stop reason: HOSPADM

## 2024-05-30 RX ORDER — OXYTOCIN/0.9 % SODIUM CHLORIDE 30/500 ML
250 PLASTIC BAG, INJECTION (ML) INTRAVENOUS CONTINUOUS
Status: ACTIVE | OUTPATIENT
Start: 2024-05-30 | End: 2024-05-30

## 2024-05-30 RX ORDER — HYDROMORPHONE HYDROCHLORIDE 1 MG/ML
0.5 INJECTION, SOLUTION INTRAMUSCULAR; INTRAVENOUS; SUBCUTANEOUS
Status: DISCONTINUED | OUTPATIENT
Start: 2024-05-30 | End: 2024-06-01 | Stop reason: HOSPADM

## 2024-05-30 RX ORDER — DROPERIDOL 2.5 MG/ML
0.62 INJECTION, SOLUTION INTRAMUSCULAR; INTRAVENOUS ONCE
Status: CANCELLED | OUTPATIENT
Start: 2024-05-30 | End: 2024-05-30

## 2024-05-30 RX ORDER — OXYTOCIN/0.9 % SODIUM CHLORIDE 30/500 ML
125 PLASTIC BAG, INJECTION (ML) INTRAVENOUS ONCE AS NEEDED
Status: DISCONTINUED | OUTPATIENT
Start: 2024-05-30 | End: 2024-06-01 | Stop reason: HOSPADM

## 2024-05-30 RX ORDER — OXYCODONE HYDROCHLORIDE 5 MG/1
5 TABLET ORAL EVERY 4 HOURS PRN
Status: DISCONTINUED | OUTPATIENT
Start: 2024-05-30 | End: 2024-06-01 | Stop reason: HOSPADM

## 2024-05-30 RX ORDER — ACETAMINOPHEN 325 MG/1
650 TABLET ORAL EVERY 6 HOURS
Status: DISCONTINUED | OUTPATIENT
Start: 2024-05-31 | End: 2024-06-01 | Stop reason: HOSPADM

## 2024-05-30 RX ORDER — SODIUM CHLORIDE, SODIUM LACTATE, POTASSIUM CHLORIDE, CALCIUM CHLORIDE 600; 310; 30; 20 MG/100ML; MG/100ML; MG/100ML; MG/100ML
125 INJECTION, SOLUTION INTRAVENOUS CONTINUOUS
Status: DISCONTINUED | OUTPATIENT
Start: 2024-05-30 | End: 2024-05-30

## 2024-05-30 RX ORDER — KETOROLAC TROMETHAMINE 30 MG/ML
30 INJECTION, SOLUTION INTRAMUSCULAR; INTRAVENOUS ONCE
Status: DISCONTINUED | OUTPATIENT
Start: 2024-05-30 | End: 2024-05-30 | Stop reason: HOSPADM

## 2024-05-30 RX ORDER — ONDANSETRON 2 MG/ML
4 INJECTION INTRAMUSCULAR; INTRAVENOUS ONCE
Status: CANCELLED | OUTPATIENT
Start: 2024-05-30 | End: 2024-05-30

## 2024-05-30 RX ORDER — NALOXONE HCL 0.4 MG/ML
0.1 VIAL (ML) INJECTION
Status: DISCONTINUED | OUTPATIENT
Start: 2024-05-30 | End: 2024-06-01 | Stop reason: HOSPADM

## 2024-05-30 RX ORDER — OXYCODONE AND ACETAMINOPHEN 7.5; 325 MG/1; MG/1
2 TABLET ORAL EVERY 4 HOURS PRN
Status: ACTIVE | OUTPATIENT
Start: 2024-05-30 | End: 2024-05-31

## 2024-05-30 RX ORDER — ONDANSETRON 2 MG/ML
INJECTION INTRAMUSCULAR; INTRAVENOUS AS NEEDED
Status: DISCONTINUED | OUTPATIENT
Start: 2024-05-30 | End: 2024-05-31 | Stop reason: SURG

## 2024-05-30 RX ORDER — BUPIVACAINE HYDROCHLORIDE 7.5 MG/ML
INJECTION, SOLUTION EPIDURAL; RETROBULBAR
Status: COMPLETED | OUTPATIENT
Start: 2024-05-30 | End: 2024-05-30

## 2024-05-30 RX ORDER — SODIUM CHLORIDE 0.9 % (FLUSH) 0.9 %
10 SYRINGE (ML) INJECTION AS NEEDED
Status: DISCONTINUED | OUTPATIENT
Start: 2024-05-30 | End: 2024-05-30 | Stop reason: HOSPADM

## 2024-05-30 RX ORDER — DOCUSATE SODIUM 100 MG/1
100 CAPSULE, LIQUID FILLED ORAL 2 TIMES DAILY PRN
Status: DISCONTINUED | OUTPATIENT
Start: 2024-05-30 | End: 2024-06-01 | Stop reason: HOSPADM

## 2024-05-30 RX ORDER — ONDANSETRON 2 MG/ML
4 INJECTION INTRAMUSCULAR; INTRAVENOUS ONCE AS NEEDED
Status: ACTIVE | OUTPATIENT
Start: 2024-05-30 | End: 2024-05-31

## 2024-05-30 RX ORDER — FAMOTIDINE 10 MG/ML
20 INJECTION, SOLUTION INTRAVENOUS ONCE AS NEEDED
Status: DISCONTINUED | OUTPATIENT
Start: 2024-05-30 | End: 2024-05-30 | Stop reason: HOSPADM

## 2024-05-30 RX ORDER — EPHEDRINE SULFATE 50 MG/ML
INJECTION INTRAVENOUS AS NEEDED
Status: DISCONTINUED | OUTPATIENT
Start: 2024-05-30 | End: 2024-05-31 | Stop reason: SURG

## 2024-05-30 RX ORDER — ONDANSETRON 4 MG/1
4 TABLET, ORALLY DISINTEGRATING ORAL EVERY 6 HOURS PRN
Status: DISCONTINUED | OUTPATIENT
Start: 2024-05-30 | End: 2024-05-30 | Stop reason: HOSPADM

## 2024-05-30 RX ORDER — ONDANSETRON 2 MG/ML
4 INJECTION INTRAMUSCULAR; INTRAVENOUS ONCE AS NEEDED
Status: COMPLETED | OUTPATIENT
Start: 2024-05-30 | End: 2024-05-30

## 2024-05-30 RX ORDER — OXYTOCIN 10 [USP'U]/ML
INJECTION, SOLUTION INTRAMUSCULAR; INTRAVENOUS AS NEEDED
Status: DISCONTINUED | OUTPATIENT
Start: 2024-05-30 | End: 2024-05-31 | Stop reason: SURG

## 2024-05-30 RX ORDER — CARBOPROST TROMETHAMINE 250 UG/ML
250 INJECTION, SOLUTION INTRAMUSCULAR ONCE AS NEEDED
Status: DISCONTINUED | OUTPATIENT
Start: 2024-05-30 | End: 2024-06-01 | Stop reason: HOSPADM

## 2024-05-30 RX ORDER — BUPIVACAINE HYDROCHLORIDE 7.5 MG/ML
INJECTION, SOLUTION INTRASPINAL AS NEEDED
Status: DISCONTINUED | OUTPATIENT
Start: 2024-05-30 | End: 2024-05-30

## 2024-05-30 RX ORDER — IBUPROFEN 600 MG/1
600 TABLET ORAL EVERY 6 HOURS
Status: DISCONTINUED | OUTPATIENT
Start: 2024-05-31 | End: 2024-06-01 | Stop reason: HOSPADM

## 2024-05-30 RX ORDER — BUPIVACAINE HCL/0.9 % NACL/PF 0.125 %
PLASTIC BAG, INJECTION (ML) EPIDURAL AS NEEDED
Status: DISCONTINUED | OUTPATIENT
Start: 2024-05-30 | End: 2024-05-31 | Stop reason: SURG

## 2024-05-30 RX ORDER — SODIUM CHLORIDE 9 MG/ML
40 INJECTION, SOLUTION INTRAVENOUS AS NEEDED
Status: DISCONTINUED | OUTPATIENT
Start: 2024-05-30 | End: 2024-05-30 | Stop reason: HOSPADM

## 2024-05-30 RX ORDER — HYDROMORPHONE HYDROCHLORIDE 1 MG/ML
0.5 INJECTION, SOLUTION INTRAMUSCULAR; INTRAVENOUS; SUBCUTANEOUS
Status: DISCONTINUED | OUTPATIENT
Start: 2024-05-30 | End: 2024-05-30 | Stop reason: HOSPADM

## 2024-05-30 RX ORDER — SODIUM CHLORIDE 0.9 % (FLUSH) 0.9 %
10 SYRINGE (ML) INJECTION EVERY 12 HOURS SCHEDULED
Status: DISCONTINUED | OUTPATIENT
Start: 2024-05-30 | End: 2024-05-30 | Stop reason: HOSPADM

## 2024-05-30 RX ORDER — FAMOTIDINE 10 MG/ML
20 INJECTION, SOLUTION INTRAVENOUS ONCE AS NEEDED
Status: COMPLETED | OUTPATIENT
Start: 2024-05-30 | End: 2024-05-30

## 2024-05-30 RX ORDER — ACETAMINOPHEN 325 MG/1
650 TABLET ORAL EVERY 6 HOURS
Status: DISCONTINUED | OUTPATIENT
Start: 2024-05-31 | End: 2024-05-30

## 2024-05-30 RX ORDER — KETOROLAC TROMETHAMINE 30 MG/ML
30 INJECTION, SOLUTION INTRAMUSCULAR; INTRAVENOUS EVERY 6 HOURS
Status: DISCONTINUED | OUTPATIENT
Start: 2024-05-30 | End: 2024-05-30

## 2024-05-30 RX ORDER — ACETAMINOPHEN 500 MG
1000 TABLET ORAL ONCE
Status: DISCONTINUED | OUTPATIENT
Start: 2024-05-30 | End: 2024-05-30 | Stop reason: HOSPADM

## 2024-05-30 RX ORDER — OXYCODONE HYDROCHLORIDE 5 MG/1
10 TABLET ORAL EVERY 4 HOURS PRN
Status: DISCONTINUED | OUTPATIENT
Start: 2024-05-30 | End: 2024-06-01 | Stop reason: HOSPADM

## 2024-05-30 RX ORDER — FAMOTIDINE 20 MG/1
20 TABLET, FILM COATED ORAL ONCE AS NEEDED
Status: DISCONTINUED | OUTPATIENT
Start: 2024-05-30 | End: 2024-05-30 | Stop reason: HOSPADM

## 2024-05-30 RX ORDER — METHYLERGONOVINE MALEATE 0.2 MG/ML
200 INJECTION INTRAVENOUS ONCE AS NEEDED
Status: DISCONTINUED | OUTPATIENT
Start: 2024-05-30 | End: 2024-05-30 | Stop reason: HOSPADM

## 2024-05-30 RX ORDER — ACETAMINOPHEN 500 MG
1000 TABLET ORAL EVERY 6 HOURS
Status: DISCONTINUED | OUTPATIENT
Start: 2024-05-30 | End: 2024-05-30

## 2024-05-30 RX ORDER — ACETAMINOPHEN 325 MG/1
650 TABLET ORAL EVERY 4 HOURS PRN
Status: ACTIVE | OUTPATIENT
Start: 2024-05-30 | End: 2024-05-31

## 2024-05-30 RX ADMIN — EPHEDRINE SULFATE 5 MG: 50 INJECTION INTRAVENOUS at 07:48

## 2024-05-30 RX ADMIN — ACETAMINOPHEN 1000 MG: 500 TABLET, FILM COATED ORAL at 13:18

## 2024-05-30 RX ADMIN — SODIUM CHLORIDE, POTASSIUM CHLORIDE, SODIUM LACTATE AND CALCIUM CHLORIDE 125 ML/HR: 600; 310; 30; 20 INJECTION, SOLUTION INTRAVENOUS at 05:55

## 2024-05-30 RX ADMIN — Medication 100 MCG: at 08:19

## 2024-05-30 RX ADMIN — ONDANSETRON 8 MG: 2 INJECTION INTRAMUSCULAR; INTRAVENOUS at 07:22

## 2024-05-30 RX ADMIN — BUPIVACAINE HYDROCHLORIDE 1.8 ML: 7.5 INJECTION, SOLUTION EPIDURAL; RETROBULBAR at 07:30

## 2024-05-30 RX ADMIN — SODIUM CHLORIDE, POTASSIUM CHLORIDE, SODIUM LACTATE AND CALCIUM CHLORIDE: 600; 310; 30; 20 INJECTION, SOLUTION INTRAVENOUS at 08:10

## 2024-05-30 RX ADMIN — Medication 100 MCG: at 07:45

## 2024-05-30 RX ADMIN — HYDROMORPHONE HYDROCHLORIDE 400 MCG: 1 INJECTION, SOLUTION INTRAMUSCULAR; INTRAVENOUS; SUBCUTANEOUS at 08:00

## 2024-05-30 RX ADMIN — EPHEDRINE SULFATE 5 MG: 50 INJECTION INTRAVENOUS at 07:56

## 2024-05-30 RX ADMIN — KETOROLAC TROMETHAMINE 15 MG: 15 INJECTION, SOLUTION INTRAMUSCULAR; INTRAVENOUS at 17:00

## 2024-05-30 RX ADMIN — HYDROMORPHONE HYDROCHLORIDE 100 MCG: 1 INJECTION, SOLUTION INTRAMUSCULAR; INTRAVENOUS; SUBCUTANEOUS at 07:36

## 2024-05-30 RX ADMIN — SODIUM CITRATE AND CITRIC ACID MONOHYDRATE 15 ML: 500; 334 SOLUTION ORAL at 07:10

## 2024-05-30 RX ADMIN — ONDANSETRON 4 MG: 2 INJECTION INTRAMUSCULAR; INTRAVENOUS at 15:41

## 2024-05-30 RX ADMIN — Medication 100 MCG: at 08:11

## 2024-05-30 RX ADMIN — EPHEDRINE SULFATE 10 MG: 50 INJECTION INTRAVENOUS at 08:12

## 2024-05-30 RX ADMIN — CEFAZOLIN 2 G: 2 INJECTION, POWDER, FOR SOLUTION INTRAMUSCULAR; INTRAVENOUS at 07:41

## 2024-05-30 RX ADMIN — MISOPROSTOL 800 MCG: 200 TABLET ORAL at 09:24

## 2024-05-30 RX ADMIN — Medication 100 MCG: at 08:07

## 2024-05-30 RX ADMIN — PROMETHAZINE HYDROCHLORIDE 12.5 MG: 25 INJECTION INTRAMUSCULAR; INTRAVENOUS at 20:39

## 2024-05-30 RX ADMIN — OXYTOCIN 30 UNITS: 10 INJECTION INTRAVENOUS at 07:51

## 2024-05-30 RX ADMIN — DROPERIDOL 0.62 MG: 2.5 INJECTION, SOLUTION INTRAMUSCULAR; INTRAVENOUS at 11:32

## 2024-05-30 RX ADMIN — EPHEDRINE SULFATE 10 MG: 50 INJECTION INTRAVENOUS at 08:07

## 2024-05-30 RX ADMIN — ONDANSETRON 4 MG: 2 INJECTION INTRAMUSCULAR; INTRAVENOUS at 07:09

## 2024-05-30 RX ADMIN — ACETAMINOPHEN 1000 MG: 500 TABLET, FILM COATED ORAL at 22:02

## 2024-05-30 RX ADMIN — Medication 250 ML/HR: at 08:53

## 2024-05-30 RX ADMIN — Medication 100 MCG: at 08:00

## 2024-05-30 RX ADMIN — EPHEDRINE SULFATE 10 MG: 50 INJECTION INTRAVENOUS at 08:09

## 2024-05-30 RX ADMIN — DEXAMETHASONE SODIUM PHOSPHATE 4 MG: 4 INJECTION INTRA-ARTICULAR; INTRALESIONAL; INTRAMUSCULAR; INTRAVENOUS; SOFT TISSUE at 08:20

## 2024-05-30 RX ADMIN — FAMOTIDINE 20 MG: 10 INJECTION INTRAVENOUS at 07:10

## 2024-05-30 RX ADMIN — Medication 100 MCG: at 07:54

## 2024-05-30 RX ADMIN — EPHEDRINE SULFATE 5 MG: 50 INJECTION INTRAVENOUS at 08:02

## 2024-05-30 NOTE — LACTATION NOTE
Mother's Name: Rut Phone #: [phone]  Infant Name:    : 24  Gestation: 39w0d  Day of life:   Birth weight:  7-13.2 (3550g)Discharge weight:  Weight Loss:   24 hour Summary of Feeds:  Voids:  Stools:  Assistive devices (shields, shells, etc):  Significant Maternal history: ,C/S, Migraines, Chlamydia  Maternal Concerns:    Maternal Goal: Breastfeed-pumped for 3 months with first child due to latching difficulties.  Mother's Medications: PNV  Breastpump for home: Yes  Ped follow up appt:    Called to recovery to assist with feeding after delivery. Infant swaddled and held by SO upon visit. With permission, unwrapped infant and placed on patient's chest. Patient states they attempted to latch prior to lactation visit. Infant crying and sucking in bottom lip. However, able to flip bottom lip out with latch attempts. Infant became very fussy. Several attempts made to calm and latch infant. Infant continued to cry and was reluctant to latch despite efforts. Patient nauseous and vomiting throughout visit. Hand expressed and syringe fed 0.3 ml plus several more drops. Recommended allowing infant to calm and re-attempt feeding. Placed initial breastfeeding packet in infant's crib. Will review later.    0545  Called to room for assistance. Patient reports infant has latched but does not stay awake or stay latched for long. Patient holding infant upon visit. Infant asleep. Demonstrated waking techniques. Infant aroused easily. Unable to illicit wide gape. Infant crying, keeping mouth closed, and pushing away. Multiple attempts made to latch infant. High palate noted with finger suck training. Infant did suck some on gloved finger. Placed nipple shield to aid with latch. Infant continued behavior despite many efforts. Patient discontinued feeding attempt and requested formula. Discussed option to supplement with donor breastmilk if desired to be exclusive. Patient comfortable with formula feeding as she  states her first child received formula. Formula with slow flow nipple provided per request. Hospital pump and supplies provided as well. Recommended patient pump anytime infant is supplemented and to use paced bottle feeding technique (education provided). Ongoing lactation support offered. Patient states she is exhausted and would like to rest.     Instructed patient our lactation team is here for continued support throughout their breastfeeding journey. Our team has encouraged patient to call with any questions or concerns that may arise after discharge.    Breastfeeding and Diaper Chart  Check List for Essentials of Positioning And Latch-on handout provided by Lactation Education Resources  Hand Expression handout provided by Lactation Education Resources  Five Keys to Successful Breastfeeding handout provided by Lactation Education Resources    The Many Benefits if Breastfeeding handout given  Breastfeeding saves time  *Breastfeeding allows you to calm or feed your baby immediately, which leads to a happier baby who cries less  *There is nothing to buy, prepare, or maintain.There is nothing to clean or sterilize.  Breastfeeding builds a mothers confidence  *She knows all her baby needs to thrive is her!  Breastfeeding saves Money  *There is no formula to buy and healthier breast fed babies have less medical costs  Healthy Mom/Healthy baby  * babies get sick less often, and when they do they are usually sick less severely and for a shorter time  * babies have fewer ear infections  * babies have fewer allergies  *Mothers who breastfeed have a lower risk for cancer, osteoporosis, anemia, high blood pressure, obesity, and Type ll diabetes  *Mothers miss less work days with sick babies  Breast fed babies have a better dental health  * babies have better jaw development which requires lest orthodontic work  *Breast milk does not promote cavities  * babies can nurse at  night without worry of tooth decay  Breastfeeding allows a baby to reach his full IQ potential  *The longer a baby is breast fed, the better their brain development  Breast fed babies and moms are more relaxed  *The hormones released during breastfeeding have a calming effect on mothers  *Breastfeeding requires mom to take a break; this may help mom get more rest after delivery  *Breastfeeding is quicker than preparing formula which allows mom and baby to get back to sleep faster  *Breastfeeding promotes bonding and allows mom to learn babies cues and care needs more quickly  Breastfeeding cleanup is easier  *The bowel movements and spit up of breast fed babies doesn't smell as bad  *Spit-up of breast fed babies doesn't stain clothing  Getting out of the hourse is easier  *No formula bottles to prepare and carry safely   *No time restraints due to worry about what baby will eat  *No worries about warming a bottle or finding safe water to prepare bottles  Breastfeeding mother get their bodies back sooner  *The uterus shrinks more quickly and completely, which allows a flatter tummy  *Breastfeeding burns 400-500 calories a day; making milk torches stored fat!  Breastfeeding is better for the environment  *There is no trash to dispose of after breastfeeding  *There is no production facility to produce breast milk; moms body does it all without the pollution of a factory    Your Guide to Breastfeeding Booklet by [employer], www.[employer] Workflow Management    Safe Storage of Breastmilk magnet: [employer]    Educational Breastfeeding Videos on   YouTube  (length of video in minutes)    Expressing the First Milk - Small Baby Series (7:19)  Hand Expression [facility] (7:34)  Attaching Your Baby at the Breast - Breastfeeding Series (10:26)  The Power of Pumping - [facility]   Maximizing Production [facility] (9:35)  Instructions for use Medela Symphony breastpump (English)  (1:58)  Medela 2-Phase Expression (4:05)  Medela double pumping video (2:19)  Choosing your PersonalFit breast shield size (3:04)  We also recommend visiting www.[employer] for valuable education and videos on breastfeeding full term AND  infants. This is a great resource to begin learning about breastfeeding during pregnancy as well.                [facility]             [phone]

## 2024-05-30 NOTE — PLAN OF CARE
Goal Outcome Evaluation:  Plan of Care Reviewed With: patient           Outcome Evaluation: Pt had a repeat  section, , 39w0d, NKA. Spinal was placed for procedure. Delivery of baby boy at 0750. No issues with pregnancy, no significant medical history. QBL of 577. PP Pitocin infusing at 125 mL/hr, Cytotec 800 mcg administered at 0924 for PP bleeding. FF/ML/U2/Scant bleeding. VSS.

## 2024-05-30 NOTE — OP NOTE
Arcoannabelle Blackwell  : 1985  MRN: [id number]  [facility]: [id number]  Date: 2024    Operative Note        Pre-op Diagnosis:  Previous  delivery, antepartum [O34.219]   Post-op Diagnosis:  Post-Op Diagnosis Codes:     * Previous  delivery, antepartum [O34.219]   Procedure: Procedure(s):   SECTION REPEAT   Surgeon: Surgeon(s):  Jesse Newman MD       Anesthesia: Spinal     Estimated Blood Loss: 580   mLs   Fluids: 1200   mLs   UOP: 100   mLs   Drains: Casper catheter   ABx: Kefzol     Specimens:  None   Findings: Normal uterus, tubes, and ovaries.    Complications: None       INDICATION: Rut Blackwell is a 38 y.o. female who presents at 39 weeks for a scheduled repeat .     PROCEDURE: After informed consent was obtained, the patient was taken to the operating room where spinal anesthesia was administered. Time out procedure was completed and perioperative antibiotics were administered. She was placed in the supine position with leftward tilt and her abdomen was prepped and draped in normal sterile fashion after a Casper catheter was placed by nursing staff.   After confirming adequate anesthesia, a Pfannenstiel incision was made with a scalpel through her old scar.  This was carried down sharply to the underlying fascia which was incised in the midline.  The fascial incision was extended laterally with Rosales scissors.  The fascial edges were then elevated and the underlying rectus muscles dissected off with sharp technique.  The rectus muscles were sharply  in the midline and the underlying peritoneum identified and entered sharply.  The peritoneal incision was then extended and an Torsten-O retractor inserted, taking care to avoid entrapping the bowel.  A low transverse uterine incision was made with a clean scalpel.  The uterine incision was bluntly extended and amniotomy was performed returning clear fluid.  The head of the infant was delivered  through the incision without difficulty and the remainder of the infant delivered with fundal pressure.  The infant was vigorous on the field, the cord was clamped and cut, and the infant handed off to waiting nursery nurse.  Cord blood was obtained and the placenta then manually removed.  The uterus was repaired in situ and cleared of clot and debris with a moist laparotomy sponge.  The uterine incision was closed with a running locked suture of 0 Monocryl.  A second imbricating layer of 0 Monocryl was placed for reinforcement.  The uterine incision was hemostatic.  The retractor was removed.  The parietal peritoneum was then closed with a running suture of 2-0 Vicryl Rapide.  The subfascial spaces and rectus muscles were inspected with hemostasis noted.  The fascia was then closed with a running suture of 0 Vicryl.  The subcutaneous tissues were irrigated and made hemostatic with Bovie cautery.  The subcutaneous tissues were reapproximated with interrupted sutures of 2-0 plain gut.  The skin was closed with a subcuticular stitch of 3-0 Vicryl Rapide and reinforced with Steri-Strips.  A sterile dressing was then applied.    After expressing the uterus the patient was transitioned to the stretcher and taken to the recovery room in stable condition.  She tolerated the procedure well without complications.  All sponge, needle, and instrument counts were correct ×3 per the OR staff.    Jesse Newman MD   5/30/2024  08:26 CDT

## 2024-05-30 NOTE — ANESTHESIA PREPROCEDURE EVALUATION
Anesthesia Evaluation                  Airway   Dental      Pulmonary    Cardiovascular         Neuro/Psych  (+) headaches  GI/Hepatic/Renal/Endo      Musculoskeletal     Abdominal    Substance History      OB/GYN    (+) Pregnant    Comment:  Plt 220, previous        Other                          Anesthesia Plan    ASA 2     spinal       Anesthetic plan, risks, benefits, and alternatives have been provided, discussed and informed consent has been obtained with: patient.        CODE STATUS:    Code Status (Patient has no pulse and is not breathing): CPR (Attempt to Resuscitate)  Medical Interventions (Patient has pulse or is breathing): Full Support

## 2024-05-30 NOTE — ANESTHESIA PROCEDURE NOTES
Spinal Block    Pre-sedation assessment completed: 5/30/2024 7:25 AM    Patient reassessed immediately prior to procedure    Patient location during procedure: OR  Start Time: 5/30/2024 7:30 AM  Stop Time: 5/30/2024 7:36 AM  Indication:at surgeon's request and procedure for pain  Performed By  CRNA/CAA: Jesse Thakkar, CRNA  Preanesthetic Checklist  Completed: patient identified, IV checked, site marked, risks and benefits discussed, surgical consent, monitors and equipment checked, pre-op evaluation and timeout performed  Spinal Block Prep:  Patient Position:sitting  Sterile Tech:cap, gloves, mask and sterile barriers  Prep:Chloraprep  Patient Monitoring:blood pressure monitoring and continuous pulse oximetry    Spinal Block Procedure  Approach:midline  Guidance:landmark technique and palpation technique  Location:L3-L4  Needle Type:Pencan  Needle Gauge:25 G  Placement of Spinal needle event:cerebrospinal fluid aspirated  Paresthesia: no  Fluid Appearance:clear  Medications: bupivacaine PF (MARCAINE) 0.75% - Intrathecal, Back   1.8 mL - 5/30/2024 7:30:00 AM   Post Assessment  Patient Tolerance:patient tolerated the procedure well with no apparent complications  Complications no  Additional Notes  X 1 ATTEMPT, 3ML 1% LIDOCAINE LOCAL INFILTRATION, NEGATIVE PARESTHESIA

## 2024-05-30 NOTE — PLAN OF CARE
Goal Outcome Evaluation:  Plan of Care Reviewed With: patient        Progress: improving  Outcome Evaluation: Patient arrived to LDR for scheduled repeat c/s

## 2024-05-30 NOTE — PROGRESS NOTES
Rut Blackwell  : 1985  MRN: [id number]  CSN: [id number]    Post-operative Day #0  Subjective   Called to see patient in recovery room due to the passage of several clots during fundal checks.  Vital signs stable.     Objective     Min/max vitals past 24 hours:   Temp  Min: 96.9 °F (36.1 °C)  Max: 97.6 °F (36.4 °C)  BP  Min: 110/56  Max: 144/69  Pulse  Min: 60  Max: 90  Pulse  Min: 60  Max: 90        No intake/output data recorded.    General: well developed; well nourished  no acute distress   Abdomen: Abdomen nondistended, uterine fundus at umbilicus and midline, surgical dressing clean and dry   Pelvic: No significant clot noted in the vagina.  The cervix is about 2 cm dilated and the lower uterine segment is distended by retained clot.  I was able to break up and express most of the clot from the lower uterine segment resulting in the uterine fundus being 3 fingers below the umbilicus.  No active bleeding noted.   Ext: Calves NT     Lab Results   Component Value Date    WBC 10.83 (H) 2024    HGB 10.7 (L) 2024    HCT 33.0 (L) 2024    MCV 91.7 2024     2024        Assessment   Status post repeat  with mild postpartum hemorrhage     Plan   Estimated blood loss with clots removed thus far is about 500 mL.  800 mcg Cytotec placed per rectum.  Will continue to monitor bleeding.  Patient hemodynamically stable.    Jesse Newman MD  2024  09:51 CDT

## 2024-05-30 NOTE — PLAN OF CARE
Goal Outcome Evaluation:  Plan of Care Reviewed With: patient        Progress: improving  Outcome Evaluation: VSS, ff mu u1, scant-light lochia, golf ball size clot expressed but bleeding scant, breastfeeding, pumping, nauseous - still needs to ambulate, alt incision with pressure dressing cdi, ba output adequate, bonding well with infant

## 2024-05-31 LAB
BASOPHILS # BLD AUTO: 0.04 10*3/MM3 (ref 0–0.2)
BASOPHILS NFR BLD AUTO: 0.3 % (ref 0–1.5)
DEPRECATED RDW RBC AUTO: 45.9 FL (ref 37–54)
EOSINOPHIL # BLD AUTO: 0.05 10*3/MM3 (ref 0–0.4)
EOSINOPHIL NFR BLD AUTO: 0.3 % (ref 0.3–6.2)
ERYTHROCYTE [DISTWIDTH] IN BLOOD BY AUTOMATED COUNT: 13.7 % (ref 12.3–15.4)
HCT VFR BLD AUTO: 27.1 % (ref 34–46.6)
HGB BLD-MCNC: 8.8 G/DL (ref 12–15.9)
IMM GRANULOCYTES # BLD AUTO: 0.07 10*3/MM3 (ref 0–0.05)
IMM GRANULOCYTES NFR BLD AUTO: 0.5 % (ref 0–0.5)
LYMPHOCYTES # BLD AUTO: 3.85 10*3/MM3 (ref 0.7–3.1)
LYMPHOCYTES NFR BLD AUTO: 25.2 % (ref 19.6–45.3)
MCH RBC QN AUTO: 29.8 PG (ref 26.6–33)
MCHC RBC AUTO-ENTMCNC: 32.5 G/DL (ref 31.5–35.7)
MCV RBC AUTO: 91.9 FL (ref 79–97)
MONOCYTES # BLD AUTO: 0.94 10*3/MM3 (ref 0.1–0.9)
MONOCYTES NFR BLD AUTO: 6.2 % (ref 5–12)
NEUTROPHILS NFR BLD AUTO: 10.32 10*3/MM3 (ref 1.7–7)
NEUTROPHILS NFR BLD AUTO: 67.5 % (ref 42.7–76)
NRBC BLD AUTO-RTO: 0 /100 WBC (ref 0–0.2)
PLATELET # BLD AUTO: 233 10*3/MM3 (ref 140–450)
PMV BLD AUTO: 10.9 FL (ref 6–12)
RBC # BLD AUTO: 2.95 10*6/MM3 (ref 3.77–5.28)
WBC NRBC COR # BLD AUTO: 15.27 10*3/MM3 (ref 3.4–10.8)

## 2024-05-31 PROCEDURE — 0503F POSTPARTUM CARE VISIT: CPT | Performed by: OBSTETRICS & GYNECOLOGY

## 2024-05-31 PROCEDURE — 85025 COMPLETE CBC W/AUTO DIFF WBC: CPT | Performed by: OBSTETRICS & GYNECOLOGY

## 2024-05-31 PROCEDURE — 25010000002 KETOROLAC TROMETHAMINE PER 15 MG: Performed by: OBSTETRICS & GYNECOLOGY

## 2024-05-31 RX ADMIN — ACETAMINOPHEN 650 MG: 325 TABLET, FILM COATED ORAL at 08:56

## 2024-05-31 RX ADMIN — ACETAMINOPHEN 650 MG: 325 TABLET, FILM COATED ORAL at 21:20

## 2024-05-31 RX ADMIN — IBUPROFEN 600 MG: 600 TABLET, FILM COATED ORAL at 18:11

## 2024-05-31 RX ADMIN — KETOROLAC TROMETHAMINE 15 MG: 15 INJECTION, SOLUTION INTRAMUSCULAR; INTRAVENOUS at 11:49

## 2024-05-31 RX ADMIN — MAGNESIUM HYDROXIDE 10 ML: 2400 SUSPENSION ORAL at 15:50

## 2024-05-31 RX ADMIN — KETOROLAC TROMETHAMINE 15 MG: 15 INJECTION, SOLUTION INTRAMUSCULAR; INTRAVENOUS at 04:48

## 2024-05-31 RX ADMIN — ACETAMINOPHEN 650 MG: 325 TABLET, FILM COATED ORAL at 15:50

## 2024-05-31 RX ADMIN — PRENATAL VIT W/ FE FUMARATE-FA TAB 27-0.8 MG 1 TABLET: 27-0.8 TAB at 08:57

## 2024-05-31 RX ADMIN — DOCUSATE SODIUM 100 MG: 100 CAPSULE, LIQUID FILLED ORAL at 21:20

## 2024-05-31 RX ADMIN — ACETAMINOPHEN 1000 MG: 500 TABLET, FILM COATED ORAL at 03:11

## 2024-05-31 NOTE — ANESTHESIA POSTPROCEDURE EVALUATION
"Patient: Rut Blackwell    Procedure Summary       Date: 24 Room / Location: [facility] LABOR DELIVERY  /  [facility] LABOR DELIVERY    Anesthesia Start: 721 Anesthesia Stop: 830    Procedure:  SECTION REPEAT (Abdomen) Diagnosis:       Previous  delivery, antepartum      (Previous  delivery, antepartum [O34.219])    Surgeons: Jesse Newman MD Provider: Jesse Thakkar CRNA    Anesthesia Type: spinal ASA Status: 2            Anesthesia Type: spinal    Vitals  Vitals Value Taken Time   /68 24 0800   Temp 97.9 °F (36.6 °C) 24 08   Pulse 76 24 0800   Resp 14 24 0800   SpO2 98 % 24 08           Post Anesthesia Care and Evaluation    Patient location during evaluation: floor  Patient participation: complete - patient participated  Level of consciousness: awake and alert  Pain management: satisfactory to patient  Anesthetic complications: No anesthetic complications  PONV Status: none  Cardiovascular status: acceptable  Respiratory status: acceptable  Post Neuraxial Block status: Motor and sensory function returned to baseline and No signs or symptoms of PDPH  Comments: /68 (BP Location: Left arm, Patient Position: Sitting)   Pulse 76   Temp 97.9 °F (36.6 °C) (Oral)   Resp 14   Ht 175.3 cm (69\")   Wt 86.5 kg (190 lb 9.6 oz)   LMP 10/05/2023   SpO2 98%   Breastfeeding Yes   BMI 28.15 kg/m²       "

## 2024-05-31 NOTE — LACTATION NOTE
Mother's Name: Rut Phone #: [phone]  Infant Name:    : 24  Gestation: 39w0d  Day of life:  1  Birth weight:  7-13.2 (3550g)Discharge weight:  Weight Loss: -1.41%  24 hour Summary of Feeds: 3BF + 0.3 ml MBM + 76 ml Formula Voids: 5 Stools:  8  Assistive devices (shields, shells, etc):  Significant Maternal history: ,C/S, Migraines, Chlamydia  Maternal Concerns:    Maternal Goal: Breastfeed-pumped for 3 months with first child due to latching difficulties.  Mother's Medications: PNV  Breastpump for home: Yes  Ped follow up appt:    Discussed signs infant is getting enough, supplementing with formula and breastfeeding, pumping after feeds, and common difficulties in the early days of breastfeeding. Recommended offering infant a partial bottle feed then attempting to direct breastfeed and see if feeding improves. Also mentioned trying drops of formula on nipple when latching if difficult to hand express large drops. Offered assistance, support, and encouragement.     Instructed patient our lactation team is here for continued support throughout their breastfeeding journey. Our team has encouraged patient to call with any questions or concerns that may arise after discharge.

## 2024-05-31 NOTE — PROGRESS NOTES
Stephen Cunningham MD  [facility]  [address]  [place] [address]  Office [phone]  Fax [phone]      [facility]   PROGRESS NOTE    Post-Op Day 1 S/P   Subjective     Patient reports:  Pain is well controlled with  ERAS .  She is ambulating. Tolerating diet. Voiding - without difficulty; flatus reported..  Vaginal bleeding is as much as expected.      Objective      Vitals: Vital Signs Range for the last 24 hours  Temperature: Temp:  [96.2 °F (35.7 °C)-98.6 °F (37 °C)] 98.1 °F (36.7 °C)   Temp Source: Temp src: Oral   BP: BP: (110-132)/(47-94) 119/63   Pulse: Heart Rate:  [58-90] 70   Respirations: Resp:  [16-18] 18   SPO2: SpO2:  [95 %-99 %] 97 %   O2 Amount (l/min):     O2 Devices Device (Oxygen Therapy): room air   Weight:              Physical Exam    Lungs clear to auscultation bilaterally   Abdomen Soft, non-tender, normal bowel sounds; no bruits, organomegaly or masses.   Incision  healing well   Extremities extremities normal, atraumatic, no cyanosis or edema     I reviewed the patient's new clinical results.  Lab Results (last 24 hours)       Procedure Component Value Units Date/Time    CBC & Differential [[id number]]  (Abnormal) Collected: 24    Specimen: Blood Updated: 24    Narrative:      The following orders were created for panel order CBC & Differential.  Procedure                               Abnormality         Status                     ---------                               -----------         ------                     CBC Auto Differential[[id number]]        Abnormal            Final result                 Please view results for these tests on the individual orders.    CBC Auto Differential [[id number]]  (Abnormal) Collected: 24    Specimen: Blood Updated: 24     WBC 15.27 10*3/mm3      RBC 2.95 10*6/mm3      Hemoglobin 8.8 g/dL      Hematocrit 27.1 %      MCV 91.9 fL      MCH 29.8 pg      MCHC 32.5 g/dL       RDW 13.7 %      RDW-SD 45.9 fl      MPV 10.9 fL      Platelets 233 10*3/mm3      Neutrophil % 67.5 %      Lymphocyte % 25.2 %      Monocyte % 6.2 %      Eosinophil % 0.3 %      Basophil % 0.3 %      Immature Grans % 0.5 %      Neutrophils, Absolute 10.32 10*3/mm3      Lymphocytes, Absolute 3.85 10*3/mm3      Monocytes, Absolute 0.94 10*3/mm3      Eosinophils, Absolute 0.05 10*3/mm3      Basophils, Absolute 0.04 10*3/mm3      Immature Grans, Absolute 0.07 10*3/mm3      nRBC 0.0 /100 WBC     T Pallidum Antibody w/ reflex RPR (Syphilis) [[id number]]  (Normal) Collected: 05/30/24 0555    Specimen: Blood Updated: 05/30/24 1530     Treponemal AB Total Non-Reactive            External Prenatal Results       Pregnancy Outside Results - Transcribed From Office Records - See Scanned Records For Details       Test Value Date Time    ABO  O  05/30/24 0555    Rh  Positive  05/30/24 0555    Antibody Screen  Negative  05/30/24 0555       Negative  12/12/23 0908    Varicella IgG  481 index 12/12/23 0908    Rubella  2.56 index 12/12/23 0908    Hgb  8.8 g/dL 05/31/24 0538       10.7 g/dL 05/30/24 0555       11.8 g/dL 03/19/24 1254       13.8 g/dL 12/12/23 0908    Hct  27.1 % 05/31/24 0538       33.0 % 05/30/24 0555       41.5 % 12/12/23 0908    Glucose Fasting GTT       Glucose Tolerance Test 1 hour       Glucose Tolerance Test 3 hour       Gonorrhea (discrete)  Negative  12/12/23 0908    Chlamydia (discrete)  Negative  12/12/23 0908    RPR  Non Reactive  03/19/24 1254       Non Reactive  12/12/23 0908    VDRL       Syphilis Antibody       HBsAg  Negative  12/12/23 0908    Herpes Simplex Virus PCR       Herpes Simplex VIrus Culture       HIV  Non Reactive  12/12/23 0908    Hep C RNA Quant PCR       Hep C Antibody       AFP       Group B Strep       GBS Susceptibility to Clindamycin       GBS Susceptibility to Erythromycin       Fetal Fibronectin       Genetic Testing, Maternal Blood                 Drug Screening       Test Value  Date Time    NIPT        Urine Drug Screen       Amphetamine Screen       Barbiturate Screen       Benzodiazepine Screen       Methadone Screen       Phencyclidine Screen       Opiates Screen       THC Screen       Cocaine Screen       Propoxyphene Screen       Buprenorphine Screen       Methamphetamine Screen       Oxycodone Screen                 Legend    ^: Historical                            Assessment & Plan        Previous  section    Previous  delivery, antepartum      Assessment:    Rut Blackwell is Day 1  post-partum  , Low Transverse   .       Plan:  continue post op care.        Stephen Cunningham MD  2024  08:34 CDT

## 2024-06-01 VITALS
HEIGHT: 69 IN | HEART RATE: 78 BPM | WEIGHT: 190.6 LBS | SYSTOLIC BLOOD PRESSURE: 124 MMHG | TEMPERATURE: 97.6 F | OXYGEN SATURATION: 98 % | BODY MASS INDEX: 28.23 KG/M2 | RESPIRATION RATE: 18 BRPM | DIASTOLIC BLOOD PRESSURE: 70 MMHG

## 2024-06-01 PROBLEM — Z98.891 PREVIOUS CESAREAN SECTION: Status: RESOLVED | Noted: 2024-05-30 | Resolved: 2024-06-01

## 2024-06-01 PROBLEM — O34.219 PREVIOUS CESAREAN DELIVERY, ANTEPARTUM: Status: RESOLVED | Noted: 2024-04-09 | Resolved: 2024-06-01

## 2024-06-01 PROBLEM — Z34.90 PREGNANCY: Status: RESOLVED | Noted: 2023-12-24 | Resolved: 2024-06-01

## 2024-06-01 PROBLEM — Z87.59 H/O MIGRAINE DURING PREGNANCY: Status: RESOLVED | Noted: 2023-12-24 | Resolved: 2024-06-01

## 2024-06-01 PROBLEM — O09.899 SINGLE UMBILICAL ARTERY AFFECTING MANAGEMENT OF MOTHER, ANTEPARTUM, SINGLE GESTATION: Status: RESOLVED | Noted: 2024-01-10 | Resolved: 2024-06-01

## 2024-06-01 PROBLEM — Z86.69 H/O MIGRAINE DURING PREGNANCY: Status: RESOLVED | Noted: 2023-12-24 | Resolved: 2024-06-01

## 2024-06-01 PROBLEM — O09.529 ANTEPARTUM MULTIGRAVIDA OF ADVANCED MATERNAL AGE: Status: RESOLVED | Noted: 2023-12-24 | Resolved: 2024-06-01

## 2024-06-01 PROCEDURE — 0503F POSTPARTUM CARE VISIT: CPT | Performed by: OBSTETRICS & GYNECOLOGY

## 2024-06-01 RX ADMIN — IBUPROFEN 600 MG: 600 TABLET, FILM COATED ORAL at 06:26

## 2024-06-01 RX ADMIN — PRENATAL VIT W/ FE FUMARATE-FA TAB 27-0.8 MG 1 TABLET: 27-0.8 TAB at 09:32

## 2024-06-01 RX ADMIN — IBUPROFEN 600 MG: 600 TABLET, FILM COATED ORAL at 00:32

## 2024-06-01 RX ADMIN — ACETAMINOPHEN 650 MG: 325 TABLET, FILM COATED ORAL at 09:32

## 2024-06-01 RX ADMIN — ACETAMINOPHEN 650 MG: 325 TABLET, FILM COATED ORAL at 04:22

## 2024-06-01 NOTE — PLAN OF CARE
Goal Outcome Evaluation:              Outcome Evaluation: Pt ready for discharge per order.

## 2024-06-01 NOTE — PLAN OF CARE
Goal Outcome Evaluation:  Plan of Care Reviewed With: patient        Progress: improving  Outcome Evaluation: VSS, ff ml uu scant lochia, alt incision with steristrips, bonding well with infant, not passing gas yet, voiding and ambulating, bonding well with infant

## 2024-06-01 NOTE — DISCHARGE SUMMARY
Discharge Summary     Salvador Blackwell  : 1985  MRN: [id number]  [facility]: [id number]    Date of Admission: 2024   Date of Discharge:  2024   Delivering Physician: Jesse Newman MD       Admission Diagnosis: Previous  delivery, antepartum [O34.219]  Previous  section [Z98.891]   Discharge Diagnosis: Pregnancy at 39w0d - delivered       Procedures: Repeat low transverse -section (LTCS)     Hospital Course  See the completed delivery note for details regarding antepartum course and delivery.    Her post-partum course was unremarkable.  On POD # 2 she felt like she ready for discharge.  She was evaluated by Dr. Melissa Bob, who agreed she was ready to be discharged home.  She had no febrile morbidity. She had normal bowel and bladder function and was hemodynamically stable.  Her wound was healing well without obvious signs of infections.  Patient taking only OTC meds for pain.  Lochia = menses.    Infant  male  fetus weighing 3550 g (7 lb 13.2 oz)   Apgars -  9 @ 1 minute /  9 @ 5 minutes.    Discharge labs  Lab Results   Component Value Date    WBC 15.27 (H) 2024    HGB 8.8 (L) 2024    HCT 27.1 (L) 2024     2024       Discharge Medications     Discharge Medications        Continue These Medications        Instructions Start Date   PRENATAL VITAMIN AND MINERAL PO   Oral               Discharge Disposition Home or Self Care   Condition on Discharge: good   Follow-up: 2 weeks with Art Bob MD  2024

## 2024-06-01 NOTE — LACTATION NOTE
Mother's Name: Rut Phone #: [phone]  Infant Name:    : 24  Gestation: 39w0d  Day of life:  2  Birth weight:  7-13.2 (3550g)Discharge weight: 7-11.1 (3490g)  Weight Loss: -1.69%  24 hour Summary of Feeds: 6 feeds of 40-50 ml of formula Voids: 7 Stools:  7  Assistive devices (shields, shells, etc):  Significant Maternal history: ,C/S, Migraines, Chlamydia  Maternal Concerns:    Maternal Goal: Breastfeed-pumped for 3 months with first child due to latching difficulties.  Mother's Medications: PNV  Breastpump for home: Yes  Ped follow up appt:  on  at 1:00 pm    Reviewed discharge breastfeeding packet as below. Patient states she was able to hand express 0.3 ml this am and fed to infant. She has been supplementing with Similac advanced but infant was having some emesis so this was changed to gentle formula and spouse noted infant's stool now transitioning. Discussed feeding options. Offered support and encouragement with follow up in OP P Lactation as desired.     Instructed patient our lactation team is here for continued support throughout their breastfeeding journey. Our team has encouraged patient to call with any questions or concerns that may arise after discharge.    Signs of Milk: Fullness, firmness, heaviness of breasts, leaking of milk.  Signs of Good Feed: Breast fullness prior to feed, breasts soft and comfortable after feeding. Infant content after feeding: calm, sleepy, relaxed and without continued hunger cues.  Signs of Plugged Ducts, Engorgement and Mastitis: Plugged ducts (milk entrapment in milk ducts)- small tender knots that often feel like little beans under breast tissue, usually tender. Massage on these areas of concern while breastfeeding or pumping to promote emptying.   Engorgement- fluid or excess milk, breasts become uncomfortably full, tight, firm (compare to the firmness of your cheek (mild), chin (moderate) or forehead (severe). First line of  treatment should be to BREASTFEED, if breasts remain full feeling after a feeding, it may be necessary to pump, ONLY UNTIL BREASTS ARE SOFT AND COMFORTABLE. DO NOT OVER PUMP (complete emptying of breasts can trigger even more milk which will cause continued, recurrent Engorgement).  Mastitis- Infection of the breast tissue, most often caused by plugged ducts that are not adequately treated by emptying, recurrent trauma to nipples or breasts (cracked or bleeding nipples). Signs: redness, swelling, tender knots or fever to breasts as well as generalized fever >101 degrees F that is often sudden onset. Treatment of mastitis, BREASTFEED! Pump after breastfeeding to achieve COMPLETE emptying of affected breast, utilizing massage to areas of concern, may use cold compress to affected area only after breast emptying. May take anti-inflammatories i.e. Ibuprofen, Motrin. CALL your OB for assessment and continued treatment with Antibiotics to adequately treat mastitis.  Infant Care: Over the first 2 weeks it is important to keep record of infant's feeding routine (feeding times and durations), wet and dirty diaper frequency, stool color and any spit ups that may occur.  Keep in mind, ALL babies will lose some weight initially (usually no more than 10% by day 3). Until infant returns to/ surpasses birth weight (which can take up to 2 weeks), it is important to offer feedings AT LEAST EVERY 3 HOURS. Remember, if you choose to supplement infant with formula or previously pumped milk, you should always pump in replacement of that feeding in order to promote and maintain a healthy milk supply!  Maternal Care: REST, sleep when the infant sleeps, stay hydrated (water is optimal) drink to thirst, increase caloric intake - breastfeeding mother's need an ADDITIONAL 500 calories per day , eat 3 meals/day as well as snacks in between, limit CAFFIENE intake to 2 cups/day. Ask your significant other, family members or friends for help when  needed, taking advantage of meal trains, allowing others to help with laundry, house chores, etc can help you focus on what is most important early on after delivery… you and your infant, and breastfeeding!   Medications to CONTINUE: Prenatal Vitamins are important to continue taking while breastfeeding. Fish oil, magnesium/calcium supplements often are helpful to support Mothers and their milk supply as well. Tylenol, Ibuprofen, regular Zyrtec, Claritin are SAFE if you suffer from seasonal allergies. Flonase is safe and often an effective medication to take if suffering from sinus drainage/pressure.  Medications to AVOID: Benadryl, Sudafed, any medications including “DM” or have a drying effect to sinus drainage will also dry a mother's milk up. Birth control- your OB will want to address birth control options with you usually around 4-6 weeks postpartum, be sure to notify your MD if you continue to breastfeed as some birth controls may significantly decrease your milk supply. Herbals- some herbs may also decrease your milk supply: PEPPERMINT, MENTHOL in any form (candies, essential oils, teas, etc), so check labels and avoid using in excess.  Pumping: Although we encourage you to focus on breastfeeding over the first 2-4 weeks, you will need to plan to begin pumping. We do recommend implementing pumping by the time infant is 4 weeks old. Pump 2-3 times per day immediately AFTER breastfeeding, it is normal to collect very small amounts initially, but the more consistently you pump, the more you will begin to collect. Store collected milk in refrigerator or freezer. You should also begin offering infant a bottle around 4 weeks. Remember to use slow flow nipples and PACE the bottle-feed. A bottle feed should take about as long as a breastfeeding session.

## 2024-06-01 NOTE — PROGRESS NOTES
" [place]  Repeat C/S Delivery Progress Note    Subjective   Postpartum Day 2: repeat C/S delivery    The patient feels well.  Her pain is well controlled with acetaminophen and ibuprofen (OTC).   She is ambulating well.  Patient describes her bleeding as moderate lochia.    Breastfeeding: infant latching without difficulty.  She is both breast and bottle feeding, waiting for milk to come in    Objective     Vital Signs Range for the last 24 hours  Temperature: Temp:  [97.6 °F (36.4 °C)-98.5 °F (36.9 °C)] 97.6 °F (36.4 °C)   Temp Source: Temp src: Oral   BP: BP: (117-129)/(62-75) 117/71   Pulse: Heart Rate:  [64-76] 64   Respirations: Resp:  [14-18] 18   SPO2: SpO2:  [97 %-100 %] 99 %   O2 Amount (l/min):     O2 Devices Device (Oxygen Therapy): room air   Weight:       Admit Height:  Height: 175.3 cm (69\")      Physical Exam:  General:  no acute distresss.  Lungs:  breathing is unlabored  Abdomen: Fundus: appropriate, firm, non tender.  Incision well-approximated, no drainage, no induration and no erythema  Extremities: normal, atraumatic, no cyanosis, and Negative edema.       Lab results reviewed:  Yes   Rubella:  No results found for: \"RUBELLAIGGIN\" Nurse Transcribed from prenatal record --  No components found for: \"EXTRUBELQUAL\"  Rh Status:    RH type   Date Value Ref Range Status   2024 Positive  Final     Immunizations:   Immunization History   Administered Date(s) Administered    COVID-19 (PFIZER) Purple Cap Monovalent 2021, 2021    Tdap 2022, 2024     Lab Results (last 24 hours)       ** No results found for the last 24 hours. **            Assessment & Plan       Previous  section    Previous  delivery, antepartum      Rut McDearmon is Day 2  post-partum  , Low Transverse   .      Plan:  Discharge home with standard precautions and return to clinic in 2 weeks.      Melissa Bob MD  2024  07:52 CDT  "

## 2024-06-18 ENCOUNTER — POSTPARTUM VISIT (OUTPATIENT)
Age: 39
End: 2024-06-18
Payer: COMMERCIAL

## 2024-06-18 VITALS
HEIGHT: 69 IN | WEIGHT: 170 LBS | BODY MASS INDEX: 25.18 KG/M2 | DIASTOLIC BLOOD PRESSURE: 82 MMHG | SYSTOLIC BLOOD PRESSURE: 118 MMHG

## 2024-06-18 PROBLEM — Z98.890 HX LEEP (LOOP ELECTROSURGICAL EXCISION PROCEDURE), CERVIX, PREGNANCY: Status: RESOLVED | Noted: 2023-12-24 | Resolved: 2024-06-18

## 2024-06-18 PROBLEM — O34.40 HX LEEP (LOOP ELECTROSURGICAL EXCISION PROCEDURE), CERVIX, PREGNANCY: Status: RESOLVED | Noted: 2023-12-24 | Resolved: 2024-06-18

## 2024-06-18 PROCEDURE — 99024 POSTOP FOLLOW-UP VISIT: CPT | Performed by: OBSTETRICS & GYNECOLOGY

## 2024-06-18 PROCEDURE — 99213 OFFICE O/P EST LOW 20 MIN: CPT | Performed by: OBSTETRICS & GYNECOLOGY

## 2024-06-18 RX ORDER — AMOXICILLIN AND CLAVULANATE POTASSIUM 875; 125 MG/1; MG/1
1 TABLET, FILM COATED ORAL 2 TIMES DAILY
Qty: 14 TABLET | Refills: 0 | Status: SHIPPED | OUTPATIENT
Start: 2024-06-18 | End: 2024-06-25

## 2024-06-18 NOTE — PROGRESS NOTES
"Rut Blackwell is a 38 y.o. female here today for incision check after undergoing  on .  She has been doing well since her discharge from the hospital and denies significant abdominal pain, nausea, or problems with her incision. Yesterday she began having pain and redness in the right breast, associated with chills. Her infant is breast-feeding well and she denies postpartum blues.    /82 (BP Location: Left arm, Patient Position: Sitting)   Ht 175.3 cm (69\")   Wt 77.1 kg (170 lb)   LMP 10/05/2023   Breastfeeding Yes   BMI 25.10 kg/m²   In general pleasant female no acute distress  Lateral right breast with streaky erythema, warmth, and tenderness, no induration or fluctuance  Abdomen soft and nontender  Her surgical taping is removed and her incision is healing well without signs of infection    Assessment: Normal incision check after a , right breast mastitis    She will continue with light activities and pelvic rest. Prescription for Augmentin given.  She will followup in 4 weeks for a postpartum visit and call in the meantime if she has any questions or concerns.   "

## 2024-07-05 ENCOUNTER — TELEPHONE (OUTPATIENT)
Dept: OBSTETRICS AND GYNECOLOGY | Age: 39
End: 2024-07-05
Payer: COMMERCIAL

## 2024-07-05 NOTE — TELEPHONE ENCOUNTER
Caller: Rut Blackwell    Relationship: Self    Best call back number: [phone]      Who are you requesting to speak with (clinical staff, DR. HOLLOWAY      What was the call regarding: PATIENT ADVISED THAT SHE FEELS LIKE MASTITIS HAS NOW DEVELOPED IN HER LEFT BREAST, WOULD LIKE TO KNOW IF MEDICATION COULD BE CALLED IN FOR HER.  IF SO PLEASE USE [facility] AT  [address] [place] [id number] [phone], PATIENT IS OUT OF TOWN FOR THIS WEEKEND.  PLEASE ALSO ADVISE IF SHE NEEDS TO MAKE AN APPT.

## 2024-07-05 NOTE — TELEPHONE ENCOUNTER
Spoke with patient by phone and informed her she would need to be evaluated at walk in clinic or urgent care since she is out of town. Pt voiced understanding.

## 2024-07-16 ENCOUNTER — POSTPARTUM VISIT (OUTPATIENT)
Age: 39
End: 2024-07-16
Payer: COMMERCIAL

## 2024-07-16 VITALS
WEIGHT: 166 LBS | SYSTOLIC BLOOD PRESSURE: 122 MMHG | BODY MASS INDEX: 24.59 KG/M2 | HEIGHT: 69 IN | DIASTOLIC BLOOD PRESSURE: 84 MMHG

## 2024-07-16 DIAGNOSIS — Z30.014 ENCOUNTER FOR INITIAL PRESCRIPTION OF INTRAUTERINE CONTRACEPTIVE DEVICE (IUD): ICD-10-CM

## 2024-07-16 NOTE — PROGRESS NOTES
"Rut Blackwell is here for a postpartum visit after a  6 weeks ago.  The depression questionnaire has been completed.  She has no signs of postpartum depression today.  She has not had a period since her delivery and is formula feeding her infant.  Her last Pap smear was 2022 and normal.  She has no history of cervical dysplasia.  She would like an IUD for contraception.    /84 (BP Location: Right arm, Patient Position: Sitting)   Ht 175.3 cm (69\")   Wt 75.3 kg (166 lb)   Breastfeeding No   BMI 24.51 kg/m²    In general pleasant female no acute distress  Neck no thyromegaly  Abdomen soft and nontender, the incision is well healed  A Pap smear was not performed.     Assessment: Normal postpartum exam.    We have discussed current Pap smear screening guidelines. We will order a Mirena IUD and contact her when it arrives to schedule placement. Call with questions or concerns.  "

## 2024-08-01 ENCOUNTER — PROCEDURE VISIT (OUTPATIENT)
Dept: OBSTETRICS AND GYNECOLOGY | Age: 39
End: 2024-08-01
Payer: COMMERCIAL

## 2024-08-01 VITALS
BODY MASS INDEX: 24.29 KG/M2 | SYSTOLIC BLOOD PRESSURE: 112 MMHG | HEIGHT: 69 IN | WEIGHT: 164 LBS | DIASTOLIC BLOOD PRESSURE: 84 MMHG

## 2024-08-01 DIAGNOSIS — Z30.430 ENCOUNTER FOR IUD INSERTION: Primary | ICD-10-CM

## 2024-08-01 LAB
B-HCG UR QL: NEGATIVE
EXPIRATION DATE: NORMAL
INTERNAL NEGATIVE CONTROL: NEGATIVE
INTERNAL POSITIVE CONTROL: POSITIVE
Lab: NORMAL

## 2024-08-01 NOTE — PROGRESS NOTES
"Chief Complaint   Patient presents with    Contraception     Patient here for IUD insertion    LOT#[id number]  EXP 08/2026  NDC 76302-403-99  PT OWNED       History:  Rut Blackwell is a 39 y.o. female who presents today for follow-up for evaluation of the above:  Pt comes in today for IUD insertion.         ROS:  Review of Systems   Constitutional:  Negative for activity change and unexpected weight loss.   Cardiovascular:  Negative for chest pain.   Gastrointestinal:  Negative for blood in stool, constipation and diarrhea.   Endocrine: Negative for cold intolerance and heat intolerance.   Genitourinary:  Negative for dyspareunia, pelvic pain and vaginal discharge.   Musculoskeletal:  Negative for arthralgias, back pain, neck pain and neck stiffness.   Skin:  Negative for rash.   Neurological:  Negative for dizziness and headache.   Psychiatric/Behavioral:  Negative for sleep disturbance. The patient is not nervous/anxious.        Ms. Blackwell  reports that she has never smoked. She has never used smokeless tobacco. She reports that she does not currently use alcohol. She reports that she does not use drugs.      Current Outpatient Medications:     Prenatal Vit-Fe Fumarate-FA (PRENATAL VITAMIN AND MINERAL PO), Take  by mouth., Disp: , Rfl:       OBJECTIVE:  /84 (BP Location: Left arm, Patient Position: Sitting, Cuff Size: Adult)   Ht 175.3 cm (69\")   Wt 74.4 kg (164 lb)   LMP  (LMP Unknown)   Breastfeeding No   BMI 24.22 kg/m²    Physical Exam  Vitals and nursing note reviewed.   Constitutional:       Appearance: She is well-developed.   HENT:      Head: Normocephalic and atraumatic.   Abdominal:      General: There is no distension.      Palpations: Abdomen is soft.      Tenderness: There is no abdominal tenderness.   Genitourinary:     Labia:         Right: No rash, tenderness, lesion or injury.         Left: No rash, tenderness, lesion or injury.       Vagina: Normal. No vaginal discharge, erythema " or tenderness.      Cervix: No cervical motion tenderness, discharge or friability.      Uterus: Not deviated, not enlarged and not tender.       Adnexa:         Right: No mass, tenderness or fullness.          Left: No mass, tenderness or fullness.     Skin:     General: Skin is warm and dry.   Neurological:      Mental Status: She is alert and oriented to person, place, and time.   Psychiatric:         Behavior: Behavior normal.         Thought Content: Thought content normal.         Judgment: Judgment normal.         Assessment/Plan    Diagnoses and all orders for this visit:    1. Encounter for IUD insertion (Primary)  -     POC Pregnancy, Urine  -     Chlamydia trachomatis, Neisseria gonorrhoeae, PCR w/ confirmation - Swab, Cervix    Pt comes in today for IUD insertion.   UPT negative.  Educated on increased risk of perforation, infection, expulsion and adherence to uterine wall, especially due to PP state. Pt voiced understanding.       An After Visit Summary was printed and given to the patient at discharge.  Return in about 4 weeks (around 8/29/2024) for ultrasound and OV for IUD check. . Sooner if problems arise.          FLORENTINO Winston  Electronically Signed

## 2024-08-01 NOTE — PROGRESS NOTES
"    /84 (BP Location: Left arm, Patient Position: Sitting, Cuff Size: Adult)   Ht 175.3 cm (69\")   Wt 74.4 kg (164 lb)   LMP  (LMP Unknown)   Breastfeeding No   BMI 24.22 kg/m²      A urine pregnancy test in the office today is negative.    We have discussed the IUD, common side effects, and potential adverse events like uterine perforation, infection, or expulsion.  She has signed the consent form after answering her questions.    MIRENA IUD lot number [id number] was placed today.  The cervix was visualized and a sample was obtained for gonorrhea and chlamydia testing. The cervix was then cleansed with BETADINE swabs.  The anterior lip was grasped with a single-tooth tenaculum.  The uterus sounded to 6 cm.  The IUD was placed at the uterine fundus using sterile technique in a standard fashion.  The applicator was removed and the strings trimmed to 3 cm length.  The tenaculum site was made hemostatic with silver nitrate sticks. She tolerated the procedure well.    Assessment: IUD placement.    Rut Blackwell will return in one month for an IUD check.  She is given instructions to call if she has any fevers, heavy bleeding, severe pain, or nausea.    "

## 2024-08-04 LAB
C TRACH RRNA SPEC QL NAA+PROBE: NEGATIVE
N GONORRHOEA RRNA SPEC QL NAA+PROBE: NEGATIVE

## 2024-08-29 ENCOUNTER — OFFICE VISIT (OUTPATIENT)
Dept: OBSTETRICS AND GYNECOLOGY | Age: 39
End: 2024-08-29
Payer: COMMERCIAL

## 2024-08-29 VITALS
BODY MASS INDEX: 24.59 KG/M2 | DIASTOLIC BLOOD PRESSURE: 68 MMHG | SYSTOLIC BLOOD PRESSURE: 108 MMHG | HEIGHT: 69 IN | WEIGHT: 166 LBS

## 2024-08-29 DIAGNOSIS — Z30.432 ENCOUNTER FOR IUD REMOVAL: ICD-10-CM

## 2024-08-29 DIAGNOSIS — T83.32XA MALPOSITIONED INTRAUTERINE DEVICE (IUD), INITIAL ENCOUNTER: Primary | ICD-10-CM

## 2024-08-29 DIAGNOSIS — Z30.09 COUNSELING FOR BIRTH CONTROL REGARDING INTRAUTERINE DEVICE (IUD): ICD-10-CM

## 2024-08-29 RX ORDER — LEVONORGESTREL 52 MG/1
1 INTRAUTERINE DEVICE INTRAUTERINE
COMMUNITY
End: 2024-08-29

## 2024-08-29 NOTE — PROGRESS NOTES
IUD Removal    Date of procedure:  8/29/2024    Risks and benefits discussed? yes  All questions answered? yes  Consents given by The patient  Written consent obtained? yes  Reason for removal: Possible wrong placement    Local anesthesia used:  no    Procedure documentation:    A speculum was placed in order to view the cervix.  A tenaculum did not need to be placed on the anterior cervical lip.  Cervical dilation did not need to be performed in order to access the string.  The IUD string was easily seen.  The string was grasped and the IUD was removed without difficulty.  The IUD did not appear to be adherent to the uterine cavity. It was removed intact.    She tolerated the procedure without any difficulty.     Post procedure instructions: Patient notified to call with heavy bleeding, fever or increasing pain.    Follow up needed.    This note was electronically signed.    FLORENTINO Winston

## 2024-08-29 NOTE — PROGRESS NOTES
"Chief Complaint   Patient presents with    Contraception     Patient here for IUD check. Patient had u/s in office. Patient reports bleeding heavy the entire month.        History:  Rut Blackwell is a 39 y.o. female who presents today for follow-up for evaluation of the above:  Pt comes in today for IUD check. Pt complains of bleeding pretty much daily since IUD insertion. Denies any pain.         ROS:  Review of Systems   Constitutional:  Negative for activity change and unexpected weight loss.   Cardiovascular:  Negative for chest pain.   Gastrointestinal:  Negative for blood in stool, constipation and diarrhea.   Endocrine: Negative for cold intolerance and heat intolerance.   Genitourinary:  Positive for menstrual problem. Negative for dyspareunia, pelvic pain and vaginal discharge.   Musculoskeletal:  Negative for arthralgias, back pain, neck pain and neck stiffness.   Skin:  Negative for rash.   Neurological:  Negative for dizziness and headache.   Psychiatric/Behavioral:  Negative for sleep disturbance. The patient is not nervous/anxious.        Ms. Blackwell  reports that she has never smoked. She has never used smokeless tobacco. She reports that she does not currently use alcohol. She reports that she does not use drugs.      Current Outpatient Medications:     Levonorgestrel (MIRENA) 20 MCG/DAY intrauterine device IUD, To be inserted one time by prescriber. Route intrauterine., Disp: 1 each, Rfl: 0    Prenatal Vit-Fe Fumarate-FA (PRENATAL VITAMIN AND MINERAL PO), Take  by mouth., Disp: , Rfl:       OBJECTIVE:  /68 (BP Location: Left arm, Patient Position: Sitting, Cuff Size: Adult)   Ht 175.3 cm (69\")   Wt 75.3 kg (166 lb)   LMP  (LMP Unknown)   BMI 24.51 kg/m²    Physical Exam  Vitals and nursing note reviewed. Exam conducted with a chaperone present.   Constitutional:       Appearance: She is well-developed.   HENT:      Head: Normocephalic and atraumatic.   Abdominal:      General: There " is no distension.      Palpations: Abdomen is soft. There is no mass.      Tenderness: There is no abdominal tenderness. There is no right CVA tenderness, left CVA tenderness or rebound.      Hernia: There is no hernia in the left inguinal area or right inguinal area.   Genitourinary:     General: Normal vulva.      Exam position: Lithotomy position.      Labia:         Right: No rash, tenderness, lesion or injury.         Left: No rash, tenderness, lesion or injury.       Vagina: Normal. No vaginal discharge, erythema, tenderness or bleeding.      Cervix: Normal.      Uterus: Normal. Not enlarged and not tender.       Adnexa: Right adnexa normal and left adnexa normal.        Right: No mass, tenderness or fullness.          Left: No mass, tenderness or fullness.        Comments: IUD strings visualized.     Musculoskeletal:      Cervical back: Normal range of motion.   Lymphadenopathy:      Lower Body: No right inguinal adenopathy. No left inguinal adenopathy.   Skin:     General: Skin is warm and dry.   Neurological:      Mental Status: She is alert and oriented to person, place, and time.   Psychiatric:         Mood and Affect: Mood normal.         Behavior: Behavior normal.         Thought Content: Thought content normal.         Judgment: Judgment normal.         Assessment/Plan    Diagnoses and all orders for this visit:    1. Malpositioned intrauterine device (IUD), initial encounter (Primary)    2. Counseling for birth control regarding intrauterine device (IUD)  -     Levonorgestrel (MIRENA) 20 MCG/DAY intrauterine device IUD; To be inserted one time by prescriber. Route intrauterine.  Dispense: 1 each; Refill: 0    3. Encounter for IUD removal    Pt comes in today for IUD check 1 month post IUD insertion. Main complaint is that of bleeding daily.  Ultrasound questioned placement of IUD in possible cervix. Discussed case with Dr. Kaiser who okayed removal in office.   Discussed case with pt. Since  questionable current placement, as well as bleeding and possibility of reduced efficacy will proceed with removal today. Pt does wish to proceed with trialing new one. Order signed.       An After Visit Summary was printed and given to the patient at discharge.  Return for IUD insertion. . Sooner if problems arise.          FLORENTINO Winston  Electronically Signed

## 2024-10-04 ENCOUNTER — PROCEDURE VISIT (OUTPATIENT)
Dept: OBSTETRICS AND GYNECOLOGY | Age: 39
End: 2024-10-04

## 2024-10-04 VITALS
DIASTOLIC BLOOD PRESSURE: 64 MMHG | HEIGHT: 69 IN | WEIGHT: 165 LBS | SYSTOLIC BLOOD PRESSURE: 102 MMHG | BODY MASS INDEX: 24.44 KG/M2

## 2024-10-04 DIAGNOSIS — Z30.430 ENCOUNTER FOR IUD INSERTION: Primary | ICD-10-CM

## 2024-10-04 NOTE — PROGRESS NOTES
"  /64 (BP Location: Left arm, Patient Position: Sitting, Cuff Size: Adult)   Ht 175.3 cm (69\")   Wt 74.8 kg (165 lb)   LMP 09/19/2024   BMI 24.37 kg/m²      A urine pregnancy test in the office today is negative.    We have discussed the IUD, common side effects, and potential adverse events like uterine perforation, infection, or expulsion.  She has signed the consent form after answering her questions.    MIRENA IUD lot number [id number] was placed today.  The cervix was visualized and a sample was obtained for gonorrhea and chlamydia testing. The cervix was then cleansed with BETADINE swabs.  The anterior lip was grasped with a single-tooth tenaculum.  The uterus sounded to 7 cm.  The IUD was placed at the uterine fundus using sterile technique in a standard fashion.  The applicator was removed and the strings trimmed to 3 cm length.  The tenaculum site was made hemostatic with silver nitrate sticks. She tolerated the procedure well.    Assessment: IUD placement.    Rut Blackwell will return in one month for an IUD check.  She is given instructions to call if she has any fevers, heavy bleeding, severe pain, or nausea.    "

## 2024-10-04 NOTE — PROGRESS NOTES
"Chief Complaint   Patient presents with    Contraception     Patient here for IUD insertion. Patient had a different IUD placed 8/1/24 and at the 1 mo f/u IUD appeared malpositioned. Patient had removal at that time.   LOT #[id number]  EXP 10/2026  NDC 49808-920-72       History:  Rut Blackwell is a 39 y.o. female who presents today for follow-up for evaluation of the above:  PT here for IUD insertion.         ROS:  Review of Systems   Constitutional:  Negative for activity change and unexpected weight loss.   Cardiovascular:  Negative for chest pain.   Gastrointestinal:  Negative for blood in stool, constipation and diarrhea.   Endocrine: Negative for cold intolerance and heat intolerance.   Genitourinary:  Negative for dyspareunia, pelvic pain and vaginal discharge.   Musculoskeletal:  Negative for arthralgias, back pain, neck pain and neck stiffness.   Skin:  Negative for rash.   Neurological:  Negative for dizziness and headache.   Psychiatric/Behavioral:  Negative for sleep disturbance. The patient is not nervous/anxious.        Ms. Blackwell  reports that she has never smoked. She has never used smokeless tobacco. She reports that she does not currently use alcohol. She reports that she does not use drugs.      Current Outpatient Medications:     Prenatal Vit-Fe Fumarate-FA (PRENATAL VITAMIN AND MINERAL PO), Take  by mouth., Disp: , Rfl:       OBJECTIVE:  /64 (BP Location: Left arm, Patient Position: Sitting, Cuff Size: Adult)   Ht 175.3 cm (69\")   Wt 74.8 kg (165 lb)   LMP 09/19/2024   BMI 24.37 kg/m²    Physical Exam  Vitals and nursing note reviewed. Exam conducted with a chaperone present.   Constitutional:       Appearance: She is well-developed.   HENT:      Head: Normocephalic and atraumatic.   Abdominal:      General: There is no distension.      Palpations: Abdomen is soft.      Tenderness: There is no abdominal tenderness.   Genitourinary:     Labia:         Right: No rash, tenderness, " lesion or injury.         Left: No rash, tenderness, lesion or injury.       Vagina: Normal. No vaginal discharge, erythema or tenderness.      Cervix: No cervical motion tenderness, discharge or friability.      Uterus: Not deviated, not enlarged and not tender.       Adnexa:         Right: No mass, tenderness or fullness.          Left: No mass, tenderness or fullness.     Skin:     General: Skin is warm and dry.   Neurological:      Mental Status: She is alert and oriented to person, place, and time.   Psychiatric:         Behavior: Behavior normal.         Thought Content: Thought content normal.         Judgment: Judgment normal.         Assessment/Plan    Diagnoses and all orders for this visit:    1. Encounter for IUD insertion (Primary)  -     POC Pregnancy, Urine  -     Chlamydia trachomatis, Neisseria gonorrhoeae, PCR w/ confirmation - Swab, Cervix    Pt comes in today for IUD insertion.   UPT negative       An After Visit Summary was printed and given to the patient at discharge.  Return in about 4 weeks (around 11/1/2024) for ultrasound and OV for IUD check. Sooner if problems arise.          FLORENTINO Winston  Electronically Signed

## 2024-10-08 LAB
C TRACH RRNA SPEC QL NAA+PROBE: NEGATIVE
N GONORRHOEA RRNA SPEC QL NAA+PROBE: NEGATIVE

## 2024-11-04 ENCOUNTER — OFFICE VISIT (OUTPATIENT)
Dept: OBSTETRICS AND GYNECOLOGY | Age: 39
End: 2024-11-04
Payer: COMMERCIAL

## 2024-11-04 VITALS
WEIGHT: 168 LBS | BODY MASS INDEX: 24.88 KG/M2 | SYSTOLIC BLOOD PRESSURE: 102 MMHG | HEIGHT: 69 IN | DIASTOLIC BLOOD PRESSURE: 64 MMHG

## 2024-11-04 DIAGNOSIS — N83.201 RIGHT OVARIAN CYST: ICD-10-CM

## 2024-11-04 DIAGNOSIS — Z30.431 IUD CHECK UP: Primary | ICD-10-CM

## 2024-11-04 PROCEDURE — 99214 OFFICE O/P EST MOD 30 MIN: CPT | Performed by: NURSE PRACTITIONER

## 2024-11-04 RX ORDER — LEVONORGESTREL 52 MG/1
1 INTRAUTERINE DEVICE INTRAUTERINE
COMMUNITY

## 2024-11-05 NOTE — PROGRESS NOTES
"Chief Complaint   Patient presents with    Contraception     Patient here for IUD check. U/S in office.        History:  Rut Matta is a 39 y.o. female who presents today for follow-up for evaluation of the above:  Pt comes in today for IUD check 1 month after insertion.         ROS:  Review of Systems   Constitutional:  Negative for activity change and unexpected weight loss.   Cardiovascular:  Negative for chest pain.   Gastrointestinal:  Negative for blood in stool, constipation and diarrhea.   Endocrine: Negative for cold intolerance and heat intolerance.   Genitourinary:  Negative for dyspareunia, pelvic pain and vaginal discharge.   Musculoskeletal:  Negative for arthralgias, back pain, neck pain and neck stiffness.   Skin:  Negative for rash.   Neurological:  Negative for dizziness and headache.   Psychiatric/Behavioral:  Negative for sleep disturbance. The patient is not nervous/anxious.        Ms. Matta  reports that she has never smoked. She has never used smokeless tobacco. She reports that she does not currently use alcohol. She reports that she does not use drugs.      Current Outpatient Medications:     Levonorgestrel (Mirena, 52 MG,) 20 MCG/DAY intrauterine device IUD, To be inserted one time by prescriber. Route intrauterine., Disp: , Rfl:     Prenatal Vit-Fe Fumarate-FA (PRENATAL VITAMIN AND MINERAL PO), Take  by mouth., Disp: , Rfl:       OBJECTIVE:  /64 (BP Location: Left arm, Patient Position: Sitting, Cuff Size: Adult)   Ht 175.3 cm (69\")   Wt 76.2 kg (168 lb)   LMP  (LMP Unknown)   BMI 24.81 kg/m²    Physical Exam  Vitals and nursing note reviewed.   Constitutional:       Appearance: She is well-developed.   HENT:      Head: Normocephalic and atraumatic.   Eyes:      General:         Right eye: No discharge.         Left eye: No discharge.      Conjunctiva/sclera: Conjunctivae normal.   Neck:      Thyroid: No thyromegaly.   Cardiovascular:      Rate and Rhythm: Normal rate and " regular rhythm.      Heart sounds: Normal heart sounds. No murmur heard.  Pulmonary:      Effort: Pulmonary effort is normal.      Breath sounds: Normal breath sounds.   Musculoskeletal:      Cervical back: Normal range of motion and neck supple.   Skin:     General: Skin is warm and dry.   Neurological:      Mental Status: She is alert and oriented to person, place, and time.   Psychiatric:         Mood and Affect: Mood normal.         Behavior: Behavior normal.         Thought Content: Thought content normal.         Judgment: Judgment normal.         Assessment/Plan    Diagnoses and all orders for this visit:    1. IUD check up (Primary)    2. Right ovarian cyst    PT comes in today for IUD check 1 month after insertion.  Ultrasound shows normal iud. Incidental right ovarian cyst noted.  Will repeat ultrasound with annual.        An After Visit Summary was printed and given to the patient at discharge.  Return for add to ultrasound to annual. Sooner if problems arise.          FLORENTINO Winston  Electronically Signed

## 2025-01-30 ENCOUNTER — OFFICE VISIT (OUTPATIENT)
Dept: OBSTETRICS AND GYNECOLOGY | Age: 40
End: 2025-01-30
Payer: COMMERCIAL

## 2025-01-30 VITALS
WEIGHT: 172 LBS | HEIGHT: 69 IN | SYSTOLIC BLOOD PRESSURE: 102 MMHG | BODY MASS INDEX: 25.48 KG/M2 | DIASTOLIC BLOOD PRESSURE: 70 MMHG

## 2025-01-30 DIAGNOSIS — Z80.3 FAMILY HISTORY OF BREAST CANCER: ICD-10-CM

## 2025-01-30 DIAGNOSIS — Z30.431 IUD CHECK UP: ICD-10-CM

## 2025-01-30 DIAGNOSIS — Z12.31 ENCOUNTER FOR SCREENING MAMMOGRAM FOR BREAST CANCER: ICD-10-CM

## 2025-01-30 DIAGNOSIS — Z01.419 WELL WOMAN EXAM WITH ROUTINE GYNECOLOGICAL EXAM: Primary | ICD-10-CM

## 2025-01-30 DIAGNOSIS — N83.202 LEFT OVARIAN CYST: ICD-10-CM

## 2025-01-30 PROCEDURE — G0123 SCREEN CERV/VAG THIN LAYER: HCPCS | Performed by: NURSE PRACTITIONER

## 2025-01-30 PROCEDURE — 87624 HPV HI-RISK TYP POOLED RSLT: CPT | Performed by: NURSE PRACTITIONER

## 2025-01-30 NOTE — PROGRESS NOTES
"Chief Complaint   Patient presents with    Gynecologic Exam     Patient here for annual and cyst check, last pap 22, U/S in office. Patient denies any problems or concerns.           Subjective     Rut Matta is a 39 y.o. female    History of Present Illness  Pt comes in today for annual wellness exam. Also had ultrasound to follow up on cyst. Denies problems.     /70 (BP Location: Left arm, Patient Position: Sitting, Cuff Size: Adult)   Ht 175.3 cm (69\")   Wt 78 kg (172 lb)   LMP 2025   BMI 25.40 kg/m²     Outpatient Encounter Medications as of 2025   Medication Sig Dispense Refill    Levonorgestrel (Mirena, 52 MG,) 20 MCG/DAY intrauterine device IUD To be inserted one time by prescriber. Route intrauterine.      [DISCONTINUED] Prenatal Vit-Fe Fumarate-FA (PRENATAL VITAMIN AND MINERAL PO) Take  by mouth.       No facility-administered encounter medications on file as of 2025.       Past Medical History  Past Medical History:   Diagnosis Date    Abnormal Pap smear of cervix     Chlamydia     Migraine     Placenta previa 2022    Urinary tract infection     Varicella         Surgical History  Past Surgical History:   Procedure Laterality Date    CERVICAL BIOPSY  W/ LOOP ELECTRODE EXCISION       SECTION N/A 2022    Procedure:  SECTION PRIMARY;  Surgeon: Jesse Newman MD;  Location:  [facility];  Service: Obstetrics/Gynecology;  Laterality: N/A;     SECTION N/A 2024    Procedure:  SECTION REPEAT;  Surgeon: Jesse Newman MD;  Location:  [facility];  Service: Obstetrics/Gynecology;  Laterality: N/A;    WISDOM TOOTH EXTRACTION  -       Family History  Family History   Problem Relation Age of Onset    No Known Problems Father     Breast cancer Mother     Breast cancer Maternal Aunt     Diabetes Paternal Uncle     Ovarian cancer Neg Hx     Uterine cancer Neg Hx     Colon cancer Neg Hx     " Melanoma Neg Hx        The following portions of the patient's history were reviewed and updated as appropriate: allergies, current medications, past family history, past medical history, past social history, past surgical history, and problem list.    Review of Systems   Constitutional:  Negative for activity change and unexpected weight loss.   Cardiovascular:  Negative for chest pain.   Gastrointestinal:  Negative for blood in stool, constipation and diarrhea.   Endocrine: Negative for cold intolerance and heat intolerance.   Genitourinary:  Negative for dyspareunia, pelvic pain and vaginal discharge.   Musculoskeletal:  Negative for arthralgias, back pain, neck pain and neck stiffness.   Skin:  Negative for rash.   Neurological:  Negative for dizziness and headache.   Psychiatric/Behavioral:  Negative for sleep disturbance. The patient is not nervous/anxious.        Objective   Physical Exam  Vitals and nursing note reviewed. Exam conducted with a chaperone present.   Constitutional:       General: She is not in acute distress.     Appearance: She is well-developed. She is not diaphoretic.   HENT:      Head: Normocephalic.      Right Ear: External ear normal.      Left Ear: External ear normal.      Nose: Nose normal.   Eyes:      General: No scleral icterus.        Right eye: No discharge.         Left eye: No discharge.      Conjunctiva/sclera: Conjunctivae normal.      Pupils: Pupils are equal, round, and reactive to light.   Neck:      Thyroid: No thyromegaly.      Vascular: No carotid bruit.      Trachea: No tracheal deviation.   Cardiovascular:      Rate and Rhythm: Normal rate and regular rhythm.      Heart sounds: Normal heart sounds. No murmur heard.  Pulmonary:      Effort: Pulmonary effort is normal. No respiratory distress.      Breath sounds: Normal breath sounds. No wheezing.   Chest:   Breasts:     Breasts are symmetrical.      Right: Normal. No swelling, bleeding, inverted nipple, mass, nipple  discharge, skin change or tenderness.      Left: Normal. No swelling, bleeding, inverted nipple, mass, nipple discharge, skin change or tenderness.   Abdominal:      General: There is no distension.      Palpations: Abdomen is soft. There is no mass.      Tenderness: There is no abdominal tenderness. There is no right CVA tenderness, left CVA tenderness or guarding.      Hernia: No hernia is present. There is no hernia in the left inguinal area or right inguinal area.   Genitourinary:     General: Normal vulva.      Exam position: Lithotomy position.      Labia:         Right: No rash, tenderness, lesion or injury.         Left: No rash, tenderness, lesion or injury.       Vagina: Normal. No signs of injury and foreign body. No vaginal discharge, erythema, tenderness or bleeding.      Cervix: Normal.      Uterus: Normal. Not enlarged, not fixed and not tender.       Adnexa: Right adnexa normal and left adnexa normal.        Right: No mass, tenderness or fullness.          Left: No mass, tenderness or fullness.        Rectum: Normal. No mass.      Comments:   BSU normal  Urethral meatus  Normal  Perineum  Normal    IUD strings visualized.  Musculoskeletal:         General: No tenderness. Normal range of motion.      Cervical back: Normal range of motion and neck supple.   Lymphadenopathy:      Head:      Right side of head: No submental, submandibular, tonsillar, preauricular, posterior auricular or occipital adenopathy.      Left side of head: No submental, submandibular, tonsillar, preauricular, posterior auricular or occipital adenopathy.      Cervical: No cervical adenopathy.      Right cervical: No superficial, deep or posterior cervical adenopathy.     Left cervical: No superficial, deep or posterior cervical adenopathy.      Upper Body:      Right upper body: No supraclavicular, axillary or pectoral adenopathy.      Left upper body: No supraclavicular, axillary or pectoral adenopathy.      Lower Body: No right  inguinal adenopathy. No left inguinal adenopathy.   Skin:     General: Skin is warm and dry.      Findings: No bruising, erythema or rash.   Neurological:      Mental Status: She is alert and oriented to person, place, and time.      Coordination: Coordination normal.   Psychiatric:         Mood and Affect: Mood normal.         Behavior: Behavior normal.         Thought Content: Thought content normal.         Judgment: Judgment normal.         Assessment & Plan   Diagnoses and all orders for this visit:    1. Well woman exam with routine gynecological exam (Primary)  -     CBC & Differential  -     Comprehensive Metabolic Panel  -     Lipid Panel With LDL / HDL Ratio  -     TSH  -     Hemoglobin A1c  -     Urine Culture - , Urine, Clean Catch  -     UA / M With / Rflx Culture([facility] ONLY) - Urine, Clean Catch  -     Liquid-based Pap Smear, Screening    2. IUD check up    3. Left ovarian cyst    4. Encounter for screening mammogram for breast cancer  -     Mammo Screening Digital Tomosynthesis Bilateral With CAD; Future    5. Family history of breast cancer  -     Mammo Screening Digital Tomosynthesis Bilateral With CAD; Future         Normal GYN exam. Encouraged SBE, pt is aware how to do self breast exam and the importance of same. Discussed weight management and importance of maintaining a healthy weight. Discussed Vitamin D intake and the importance of adequate vitamin D for both bone health and a healthy immune system.  Discussed daily exercise and the importance of same, in regards to a healthy heart as well as helping to maintain her weight and improving her mental health.  Colonoscopy is not indicated. Mammogram will be scheduled. Bone density is not indicated. Pap smear is done per ASCCP guidelines. HPV is done. Lab work up will be scheduled.     Pt has IUD. Doing well.    Ultrasound today showed prior right ovarian cyst has resolved. Simple 3.4cm left cyst. IUD normal.  Pt not symptomatic so no need to  repeat unless that changes.      BMI is >= 25 and <30. (Overweight) The following options were offered after discussion;: weight loss educational material (shared in after visit summary)      Kari Chou, FLORENTINO  1/30/2025    Return in about 1 year (around 1/30/2026) for Annual physical.

## 2025-01-31 LAB
GEN CATEG CVX/VAG CYTO-IMP: NORMAL
HPV I/H RISK 4 DNA CVX QL PROBE+SIG AMP: NOT DETECTED
LAB AP CASE REPORT: NORMAL
LAB AP GYN ADDITIONAL INFORMATION: NORMAL
Lab: NORMAL
PATH INTERP SPEC-IMP: NORMAL
STAT OF ADQ CVX/VAG CYTO-IMP: NORMAL

## 2025-02-01 LAB
ALBUMIN SERPL-MCNC: 4.4 G/DL (ref 3.5–5.2)
ALBUMIN/GLOB SERPL: 1.6 G/DL
ALP SERPL-CCNC: 106 U/L (ref 39–117)
ALT SERPL-CCNC: 14 U/L (ref 1–33)
APPEARANCE UR: CLEAR
AST SERPL-CCNC: 15 U/L (ref 1–32)
BACTERIA #/AREA URNS HPF: NORMAL /[HPF]
BACTERIA UR CULT: NO GROWTH
BACTERIA UR CULT: NORMAL
BASOPHILS # BLD AUTO: 0.04 10*3/MM3 (ref 0–0.2)
BASOPHILS NFR BLD AUTO: 0.5 % (ref 0–1.5)
BILIRUB SERPL-MCNC: 1.3 MG/DL (ref 0–1.2)
BILIRUB UR QL STRIP: NEGATIVE
BUN SERPL-MCNC: 9 MG/DL (ref 6–20)
BUN/CREAT SERPL: 11.5 (ref 7–25)
CALCIUM SERPL-MCNC: 9.7 MG/DL (ref 8.6–10.5)
CASTS URNS QL MICRO: NORMAL /LPF
CHLORIDE SERPL-SCNC: 100 MMOL/L (ref 98–107)
CHOLEST SERPL-MCNC: 179 MG/DL (ref 0–200)
CO2 SERPL-SCNC: 25.5 MMOL/L (ref 22–29)
COLOR UR: YELLOW
CREAT SERPL-MCNC: 0.78 MG/DL (ref 0.57–1)
EGFRCR SERPLBLD CKD-EPI 2021: 99.2 ML/MIN/1.73
EOSINOPHIL # BLD AUTO: 0.14 10*3/MM3 (ref 0–0.4)
EOSINOPHIL NFR BLD AUTO: 1.9 % (ref 0.3–6.2)
EPI CELLS #/AREA URNS HPF: NORMAL /HPF (ref 0–10)
ERYTHROCYTE [DISTWIDTH] IN BLOOD BY AUTOMATED COUNT: 12.9 % (ref 12.3–15.4)
GLOBULIN SER CALC-MCNC: 2.7 GM/DL
GLUCOSE SERPL-MCNC: 62 MG/DL (ref 65–99)
GLUCOSE UR QL STRIP: NEGATIVE
HBA1C MFR BLD: 5.1 % (ref 4.8–5.6)
HCT VFR BLD AUTO: 45.2 % (ref 34–46.6)
HDLC SERPL-MCNC: 58 MG/DL (ref 40–60)
HGB BLD-MCNC: 14.3 G/DL (ref 12–15.9)
HGB UR QL STRIP: NEGATIVE
IMM GRANULOCYTES # BLD AUTO: 0.01 10*3/MM3 (ref 0–0.05)
IMM GRANULOCYTES NFR BLD AUTO: 0.1 % (ref 0–0.5)
KETONES UR QL STRIP: NEGATIVE
LDLC SERPL CALC-MCNC: 101 MG/DL (ref 0–100)
LDLC/HDLC SERPL: 1.7 {RATIO}
LEUKOCYTE ESTERASE UR QL STRIP: NEGATIVE
LYMPHOCYTES # BLD AUTO: 1.61 10*3/MM3 (ref 0.7–3.1)
LYMPHOCYTES NFR BLD AUTO: 21.9 % (ref 19.6–45.3)
MCH RBC QN AUTO: 28.8 PG (ref 26.6–33)
MCHC RBC AUTO-ENTMCNC: 31.6 G/DL (ref 31.5–35.7)
MCV RBC AUTO: 90.9 FL (ref 79–97)
MICRO URNS: NORMAL
MICRO URNS: NORMAL
MONOCYTES # BLD AUTO: 0.36 10*3/MM3 (ref 0.1–0.9)
MONOCYTES NFR BLD AUTO: 4.9 % (ref 5–12)
NEUTROPHILS # BLD AUTO: 5.2 10*3/MM3 (ref 1.7–7)
NEUTROPHILS NFR BLD AUTO: 70.7 % (ref 42.7–76)
NITRITE UR QL STRIP: NEGATIVE
NRBC BLD AUTO-RTO: 0 /100 WBC (ref 0–0.2)
PH UR STRIP: 6.5 [PH] (ref 5–7.5)
PLATELET # BLD AUTO: 298 10*3/MM3 (ref 140–450)
POTASSIUM SERPL-SCNC: 3.7 MMOL/L (ref 3.5–5.2)
PROT SERPL-MCNC: 7.1 G/DL (ref 6–8.5)
PROT UR QL STRIP: NEGATIVE
RBC # BLD AUTO: 4.97 10*6/MM3 (ref 3.77–5.28)
RBC #/AREA URNS HPF: NORMAL /HPF (ref 0–2)
SODIUM SERPL-SCNC: 140 MMOL/L (ref 136–145)
SP GR UR STRIP: 1.01 (ref 1–1.03)
TRIGL SERPL-MCNC: 113 MG/DL (ref 0–150)
TSH SERPL DL<=0.005 MIU/L-ACNC: 1.09 UIU/ML (ref 0.27–4.2)
URINALYSIS REFLEX: NORMAL
UROBILINOGEN UR STRIP-MCNC: 0.2 MG/DL (ref 0.2–1)
VLDLC SERPL CALC-MCNC: 20 MG/DL (ref 5–40)
WBC # BLD AUTO: 7.36 10*3/MM3 (ref 3.4–10.8)
WBC #/AREA URNS HPF: NORMAL /HPF (ref 0–5)

## 2025-02-11 LAB
NCCN CRITERIA FLAG: ABNORMAL
TYRER CUZICK SCORE: 33.2

## 2025-02-12 ENCOUNTER — DOCUMENTATION (OUTPATIENT)
Dept: GENETICS | Facility: HOSPITAL | Age: 40
End: 2025-02-12
Payer: COMMERCIAL

## 2025-02-12 ENCOUNTER — HOSPITAL ENCOUNTER (OUTPATIENT)
Dept: MAMMOGRAPHY | Facility: HOSPITAL | Age: 40
Discharge: HOME | End: 2025-02-12
Admitting: NURSE PRACTITIONER
Payer: COMMERCIAL

## 2025-02-12 DIAGNOSIS — Z80.3 FAMILY HISTORY OF BREAST CANCER: ICD-10-CM

## 2025-02-12 DIAGNOSIS — Z12.31 ENCOUNTER FOR SCREENING MAMMOGRAM FOR BREAST CANCER: ICD-10-CM

## 2025-02-12 PROCEDURE — 77067 SCR MAMMO BI INCL CAD: CPT

## 2025-02-12 PROCEDURE — 77063 BREAST TOMOSYNTHESIS BI: CPT

## 2025-02-12 NOTE — PROGRESS NOTES
This patient recently completed the CARE risk assessment for a mammogram appointment. Based on the responses, the patient meets NCCN criteria for genetic testing and the Tyrer-Cuzick breast cancer risk score is > 20.     Navigator follow-up:      I spoke with the patient about the recommendations and options for risk management for elevated Tyrer-Cuzick risk scores.  The patient has declined a referral for risk management at this time.  I encouraged her to discuss management options with her provider.   She also declined genetic testing at this time.

## 2025-02-12 NOTE — PROGRESS NOTES
Meets NCCN Criteria for Genetic Testing  Declines genetic testing? Y   Reason for decline? Informative Relative Previously Tested   If Reason for Decline is Previous Testing    Amb CARE     Non-CARE     Non-CARE Confirmation    Navigator Confirmed?     Patient Reported?     Elevated Tyrer-Cuzick Score ? Or Equal to 20%    Declines referral? Y   Reason for decline? Patient Wants to Discuss with Established Provider

## (undated) DEVICE — Device

## (undated) DEVICE — PATIENT RETURN ELECTRODE, SINGLE-USE, CONTACT QUALITY MONITORING, ADULT, WITH 15 FT (4.5 M) CORD. FOR PATIENTS WEIGHING OVER 33LBS. (15KG): Brand: MEGADYNE

## (undated) DEVICE — 3M™ STERI-STRIP™ REINFORCED ADHESIVE SKIN CLOSURES, R1547, 1/2 IN X 4 IN (12 MM X 100 MM), 6 STRIPS/ENVELOPE: Brand: 3M™ STERI-STRIP™

## (undated) DEVICE — GOWN,PREVENTION PLUS,XLARGE,STERILE: Brand: MEDLINE

## (undated) DEVICE — 3M™ MEDIPORE™ H SOFT CLOTH SURGICAL TAPE 2868, 8 INCH X 10 YARD (20,3CM X 9,1M), 6 ROLLS/CASE: Brand: 3M™ MEDIPORE™

## (undated) DEVICE — SUT VIC 0 CTX 36IN J978H

## (undated) DEVICE — APPL CHLORAPREP HI/LITE 26ML ORNG

## (undated) DEVICE — LARGE, DISPOSABLE C-SECTION RETRACTOR: Brand: ALEXIS ® O C-SECTION PROTECTOR/RETRACTOR

## (undated) DEVICE — SUT VIC RAPD SZ 3/0 27IN PS1 VR935

## (undated) DEVICE — SUT VIC RAPD SZ 2/0 36IN CT1 VR945 BX/12

## (undated) DEVICE — PAD C-SECTION: Brand: MEDLINE INDUSTRIES, INC.

## (undated) DEVICE — SPNG GZ WOVN 4X4IN 12PLY 10/BX STRL

## (undated) DEVICE — ADHS LIQ MASTISOL 2/3ML

## (undated) DEVICE — CVR HNDL LIGHT RIGID

## (undated) DEVICE — 3M™ STERI-STRIP™ BLEND TONE SKIN CLOSURES, B1557, TAN, 1/2 IN X 4 IN (12MM X 100MM), 6 STRIPS/ENVELOPE: Brand: 3M™ STERI-STRIP™

## (undated) DEVICE — SUT MNCRYL 0/0 CTX 36IN Y398H

## (undated) DEVICE — PENCL SMOKE/EVAC MEGADYNE TELESCP 10FT

## (undated) DEVICE — GLOVE,SURG,SENSICARE SLT,LF,PF,8: Brand: MEDLINE

## (undated) DEVICE — SUT GUT PLAIN TPR PT 2/0 27IN 53T

## (undated) DEVICE — KENDALL SCD EXPRESS SLEEVES, KNEE LENGTH, LARGE: Brand: KENDALL SCD